# Patient Record
Sex: FEMALE | Race: WHITE | NOT HISPANIC OR LATINO | Employment: OTHER | RURAL
[De-identification: names, ages, dates, MRNs, and addresses within clinical notes are randomized per-mention and may not be internally consistent; named-entity substitution may affect disease eponyms.]

---

## 2017-01-20 DIAGNOSIS — I10 ESSENTIAL HYPERTENSION: ICD-10-CM

## 2017-01-20 DIAGNOSIS — E11.8 TYPE 2 DIABETES MELLITUS WITH COMPLICATION, UNSPECIFIED LONG TERM INSULIN USE STATUS: ICD-10-CM

## 2017-01-20 DIAGNOSIS — I65.23 BILATERAL CAROTID ARTERY STENOSIS: ICD-10-CM

## 2017-01-20 DIAGNOSIS — E78.2 MIXED HYPERLIPIDEMIA: Primary | ICD-10-CM

## 2017-01-20 DIAGNOSIS — I77.9 BILATERAL CAROTID ARTERY DISEASE (HCC): ICD-10-CM

## 2017-02-28 ENCOUNTER — OFFICE VISIT (OUTPATIENT)
Dept: CARDIOLOGY | Facility: CLINIC | Age: 77
End: 2017-02-28

## 2017-02-28 VITALS
HEART RATE: 61 BPM | DIASTOLIC BLOOD PRESSURE: 64 MMHG | BODY MASS INDEX: 18.56 KG/M2 | WEIGHT: 111.4 LBS | SYSTOLIC BLOOD PRESSURE: 129 MMHG | HEIGHT: 65 IN

## 2017-02-28 DIAGNOSIS — E78.5 DYSLIPIDEMIA: ICD-10-CM

## 2017-02-28 DIAGNOSIS — I25.10 CORONARY ARTERY DISEASE INVOLVING NATIVE CORONARY ARTERY OF NATIVE HEART WITHOUT ANGINA PECTORIS: Primary | ICD-10-CM

## 2017-02-28 DIAGNOSIS — I77.9 BILATERAL CAROTID ARTERY DISEASE (HCC): ICD-10-CM

## 2017-02-28 DIAGNOSIS — I10 ESSENTIAL HYPERTENSION: ICD-10-CM

## 2017-02-28 PROCEDURE — 99213 OFFICE O/P EST LOW 20 MIN: CPT | Performed by: NURSE PRACTITIONER

## 2017-02-28 RX ORDER — METOPROLOL TARTRATE 100 MG/1
100 TABLET ORAL EVERY 12 HOURS
Qty: 180 TABLET | Refills: 3 | Status: SHIPPED | OUTPATIENT
Start: 2017-02-28 | End: 2020-07-07 | Stop reason: SDUPTHER

## 2017-02-28 RX ORDER — ENALAPRIL MALEATE 10 MG/1
10 TABLET ORAL DAILY
Qty: 90 TABLET | Refills: 3 | Status: SHIPPED | OUTPATIENT
Start: 2017-02-28 | End: 2019-11-12 | Stop reason: SDUPTHER

## 2017-02-28 RX ORDER — SIMVASTATIN 40 MG
40 TABLET ORAL DAILY
Qty: 90 TABLET | Refills: 3 | Status: SHIPPED | OUTPATIENT
Start: 2017-02-28 | End: 2018-03-06

## 2017-02-28 RX ORDER — AMLODIPINE BESYLATE 5 MG/1
5 TABLET ORAL DAILY
Qty: 90 TABLET | Refills: 3 | Status: SHIPPED | OUTPATIENT
Start: 2017-02-28 | End: 2020-05-26 | Stop reason: SDUPTHER

## 2017-02-28 NOTE — PROGRESS NOTES
Subjective:     Encounter Date:02/28/2017      Patient ID: Maria Del Carmen De Los Santos is a 76 y.o. female.    Chief Complaint: Follow-up for CAD      PROBLEM LIST:   1. Coronary artery disease:  a. Symptoms of unstable angina.  b. Cardiac catheterization, 06/02/2010, by Dr. Zuluaga showed severe triple-vessel disease with normal LV function.   c. Four-vessel coronary artery bypass graft surgery, 06/07/2010, with LIMA graft to the LAD, veins to the ramus intermedius, circumflex and PDA.   2. Carotid artery disease:  a. Carotid angiogram, 06/02/2010, showed 90% stenosis of the right internal carotid artery.  b. Stenting of the right internal carotid artery by Dr. Jauregui using 8 mm x 40 mm stent.  c. Carotid duplex, 12/09/2011, showed patent right ICA stent, suspected 80% stenosis of the left ICA; asymptomatic.   d. Carotid ultrasound, 06/29/2012, showed 60% to 79% left ICA stenosis, patent right ICA stent.  e. Carotid duplex study, 07/22/2013, acceptable velocities; no significant change.   f. Carotid duplex scan at [facility], February 2015, was an unclear study, essentially comments on the left ICA only with moderate elevation of velocity to 223. No symptoms.  g. Persistent bilateral carotid bruit.   3. Hypertension.  4. Dyslipidemia.  5. Diabetes type 2.  6. Claudication.  7. Acute versus chronic kidney disease.         No Known Allergies      Current Outpatient Prescriptions:   •  amLODIPine (NORVASC) 5 MG tablet, Take  by mouth daily., Disp: , Rfl:   •  aspirin 325 MG tablet, Take 325 mg by mouth daily., Disp: , Rfl:   •  enalapril (VASOTEC) 10 MG tablet, Take 10 mg by mouth daily., Disp: , Rfl:   •  metFORMIN (GLUCOPHAGE) 500 MG tablet, Take 500 mg by mouth 2 (two) times a day with meals., Disp: , Rfl:   •  metoprolol tartrate (LOPRESSOR) 100 MG tablet, Take  by mouth every 12 (twelve) hours., Disp: , Rfl:   •  simvastatin (ZOCOR) 40 MG tablet, Take  by mouth daily., Disp: , Rfl:         History of Present  "Illness    Patient returns today for annual follow-up of coronary artery disease.  Since last being seen she overall notes she is done well.  Denies any chest pain, pressure, tightness.  Denies any increasing shortness of breath.  Denies any syncope, near syncope.  She does note some occasional dizziness.  This is infrequent and occurs primarily with position changes such as standing or bending over.  Her labs are followed by her primary care physician.  Only complaint is of some generalized fatigue.  However, notes that she is not typically very active and is limited secondary to arthritis discomfort.  She has an appointment in April with Dr. Tano Jauregui regarding her crest study.    The following portions of the patient's history were reviewed and updated as appropriate: allergies, current medications, past family history, past medical history, past social history, past surgical history and problem list.        Social History   Substance Use Topics   • Smoking status: Never Smoker   • Smokeless tobacco: None   • Alcohol use No         Review of Systems   Constitution: Positive for malaise/fatigue. Negative for fever and weakness.   HENT: Negative for headaches and nosebleeds.    Eyes: Negative for redness and visual disturbance.   Cardiovascular: Negative for orthopnea, palpitations and paroxysmal nocturnal dyspnea.   Respiratory: Negative for cough, snoring, sputum production and wheezing.    Hematologic/Lymphatic: Negative for bleeding problem.   Skin: Negative for flushing, itching and rash.   Musculoskeletal: Negative for falls, joint pain and muscle cramps.   Gastrointestinal: Negative for abdominal pain, diarrhea, heartburn, nausea and vomiting.   Genitourinary: Negative for hematuria.   Neurological: Positive for dizziness. Negative for excessive daytime sleepiness and tremors.   Psychiatric/Behavioral: Negative for substance abuse. The patient is not nervous/anxious.           Objective:    height is 65\" " (165.1 cm) and weight is 111 lb 6.4 oz (50.5 kg). Her blood pressure is 129/64 and her pulse is 61.         Physical Exam   Constitutional: She is oriented to person, place, and time. She appears well-developed and well-nourished. No distress.   HENT:   Mouth/Throat: Oropharynx is clear and moist.   Neck: No JVD present. Carotid bruit is present (left bruit appreciated).   Cardiovascular: Normal rate, regular rhythm, S1 normal, S2 normal and normal heart sounds.    Pulmonary/Chest: Effort normal and breath sounds normal. No respiratory distress.   Abdominal: Soft. Bowel sounds are normal. There is no tenderness.   Musculoskeletal: She exhibits no edema.   Neurological: She is alert and oriented to person, place, and time.   Skin: Skin is warm and dry.       Procedures          Assessment:   Assessment/Plan      Diagnoses and all orders for this visit:    Coronary artery disease involving native coronary artery of native heart without angina pectoris    Essential hypertension    Dyslipidemia    Bilateral carotid artery disease      Plan:    At this time we'll make no changes to the patient's medication regimen.  We'll have her keep her follow-up with Dr. Jauregui regarding her carotid disease.  We'll see her back in one year's time or sooner if needed.    TIARA Marie         Please note that portions of this note may have been completed with a voice recognition program. Efforts were made to edit the dictations, but occasionally words are mistranscribed.

## 2017-04-25 ENCOUNTER — APPOINTMENT (OUTPATIENT)
Dept: CARDIOLOGY | Facility: HOSPITAL | Age: 77
End: 2017-04-25
Attending: INTERNAL MEDICINE

## 2017-05-03 ENCOUNTER — OFFICE VISIT (OUTPATIENT)
Dept: CARDIOLOGY | Facility: CLINIC | Age: 77
End: 2017-05-03

## 2017-05-03 ENCOUNTER — HOSPITAL ENCOUNTER (OUTPATIENT)
Dept: CARDIOLOGY | Facility: HOSPITAL | Age: 77
Discharge: HOME | End: 2017-05-03
Attending: INTERNAL MEDICINE | Admitting: INTERNAL MEDICINE

## 2017-05-03 VITALS — SYSTOLIC BLOOD PRESSURE: 150 MMHG | DIASTOLIC BLOOD PRESSURE: 70 MMHG

## 2017-05-03 DIAGNOSIS — I65.23 BILATERAL CAROTID ARTERY STENOSIS: ICD-10-CM

## 2017-05-03 DIAGNOSIS — I10 ESSENTIAL HYPERTENSION: ICD-10-CM

## 2017-05-03 DIAGNOSIS — E78.2 MIXED HYPERLIPIDEMIA: ICD-10-CM

## 2017-05-03 DIAGNOSIS — I77.9 BILATERAL CAROTID ARTERY DISEASE (HCC): Primary | ICD-10-CM

## 2017-05-03 DIAGNOSIS — I77.9 BILATERAL CAROTID ARTERY DISEASE (HCC): ICD-10-CM

## 2017-05-03 PROCEDURE — 93880 EXTRACRANIAL BILAT STUDY: CPT

## 2017-05-03 PROCEDURE — 93880 EXTRACRANIAL BILAT STUDY: CPT | Performed by: INTERNAL MEDICINE

## 2017-05-08 LAB
BH CV XLRA MEAS LEFT CCA RATIO VEL: 104 CM/SEC
BH CV XLRA MEAS LEFT DIST CCA EDV: 31.4 CM/SEC
BH CV XLRA MEAS LEFT DIST CCA PSV: 103 CM/SEC
BH CV XLRA MEAS LEFT DIST ICA EDV: 35.2 CM/SEC
BH CV XLRA MEAS LEFT DIST ICA PSV: 141 CM/SEC
BH CV XLRA MEAS LEFT ICA RATIO VEL: 406 CM/SEC
BH CV XLRA MEAS LEFT ICA/CCA RATIO: 3.9
BH CV XLRA MEAS LEFT MID CCA EDV: 27 CM/SEC
BH CV XLRA MEAS LEFT MID CCA PSV: 104 CM/SEC
BH CV XLRA MEAS LEFT MID ICA EDV: 69.8 CM/SEC
BH CV XLRA MEAS LEFT MID ICA PSV: 266 CM/SEC
BH CV XLRA MEAS LEFT PROX CCA EDV: 22.1 CM/SEC
BH CV XLRA MEAS LEFT PROX CCA PSV: 85.5 CM/SEC
BH CV XLRA MEAS LEFT PROX ECA PSV: 189 CM/SEC
BH CV XLRA MEAS LEFT PROX ICA EDV: 103 CM/SEC
BH CV XLRA MEAS LEFT PROX ICA PSV: 406 CM/SEC
BH CV XLRA MEAS LEFT PROX SCLA PSV: 152 CM/SEC
BH CV XLRA MEAS LEFT VERTEBRAL A EDV: 19.2 CM/SEC
BH CV XLRA MEAS LEFT VERTEBRAL A PSV: 60.2 CM/SEC
BH CV XLRA MEAS RIGHT CCA RATIO VEL: 112 CM/SEC
BH CV XLRA MEAS RIGHT DIST CCA EDV: 19.6 CM/SEC
BH CV XLRA MEAS RIGHT DIST CCA PSV: 112 CM/SEC
BH CV XLRA MEAS RIGHT DIST ICA EDV: 17.6 CM/SEC
BH CV XLRA MEAS RIGHT DIST ICA PSV: 67.9 CM/SEC
BH CV XLRA MEAS RIGHT ICA RATIO VEL: 84.2 CM/SEC
BH CV XLRA MEAS RIGHT ICA/CCA RATIO: 0.75
BH CV XLRA MEAS RIGHT MID CCA EDV: 15.4 CM/SEC
BH CV XLRA MEAS RIGHT MID CCA PSV: 112 CM/SEC
BH CV XLRA MEAS RIGHT MID ICA EDV: 17 CM/SEC
BH CV XLRA MEAS RIGHT MID ICA PSV: 62.2 CM/SEC
BH CV XLRA MEAS RIGHT PROX CCA EDV: 17.5 CM/SEC
BH CV XLRA MEAS RIGHT PROX CCA PSV: 108 CM/SEC
BH CV XLRA MEAS RIGHT PROX ECA PSV: 149 CM/SEC
BH CV XLRA MEAS RIGHT PROX ICA EDV: 13.8 CM/SEC
BH CV XLRA MEAS RIGHT PROX ICA PSV: 84.2 CM/SEC
BH CV XLRA MEAS RIGHT PROX SCLA PSV: 137 CM/SEC
BH CV XLRA MEAS RIGHT VERTEBRAL A EDV: 11.4 CM/SEC
BH CV XLRA MEAS RIGHT VERTEBRAL A PSV: 40.2 CM/SEC
LEFT ARM BP: NORMAL MMHG
RIGHT ARM BP: NORMAL MMHG

## 2017-11-09 DIAGNOSIS — I10 ESSENTIAL HYPERTENSION: ICD-10-CM

## 2017-11-09 DIAGNOSIS — I65.23 BILATERAL CAROTID ARTERY STENOSIS: Primary | ICD-10-CM

## 2017-11-09 DIAGNOSIS — E78.5 DYSLIPIDEMIA: ICD-10-CM

## 2018-03-06 ENCOUNTER — OFFICE VISIT (OUTPATIENT)
Dept: CARDIOLOGY | Facility: CLINIC | Age: 78
End: 2018-03-06

## 2018-03-06 VITALS
WEIGHT: 115 LBS | BODY MASS INDEX: 19.16 KG/M2 | DIASTOLIC BLOOD PRESSURE: 68 MMHG | HEIGHT: 65 IN | SYSTOLIC BLOOD PRESSURE: 146 MMHG | HEART RATE: 62 BPM

## 2018-03-06 DIAGNOSIS — I77.9 BILATERAL CAROTID ARTERY DISEASE (HCC): ICD-10-CM

## 2018-03-06 DIAGNOSIS — I10 ESSENTIAL HYPERTENSION: ICD-10-CM

## 2018-03-06 DIAGNOSIS — I25.10 CORONARY ARTERY DISEASE INVOLVING NATIVE CORONARY ARTERY OF NATIVE HEART WITHOUT ANGINA PECTORIS: Primary | ICD-10-CM

## 2018-03-06 DIAGNOSIS — E78.5 DYSLIPIDEMIA: ICD-10-CM

## 2018-03-06 PROCEDURE — 99214 OFFICE O/P EST MOD 30 MIN: CPT | Performed by: INTERNAL MEDICINE

## 2018-03-06 RX ORDER — ROSUVASTATIN CALCIUM 20 MG/1
20 TABLET, COATED ORAL DAILY
COMMUNITY
End: 2020-07-07 | Stop reason: SDUPTHER

## 2018-03-06 NOTE — PROGRESS NOTES
Encounter Date:03/06/2018      Patient ID: Maria Del Carmen De Los Santos is a 77 y.o. female.    Chief Complaint: Coronary Artery Disease      PROBLEM LIST:  1. Coronary artery disease:  a. Symptoms of unstable angina.  b. Cardiac catheterization, 06/02/2010, by Dr. Zuluaga showed severe triple-vessel disease with normal LV function.   c. Four-vessel coronary artery bypass graft surgery, 06/07/2010, with LIMA graft to the LAD, veins to the ramus intermedius, circumflex and PDA.   2. Carotid artery disease:  a. Carotid angiogram, 06/02/2010, showed 90% stenosis of the right internal carotid artery.  b. Stenting of the right internal carotid artery by Dr. Jauregui using 8 mm x 40 mm stent.  c. Carotid duplex, 12/09/2011, showed patent right ICA stent, suspected 80% stenosis of the left ICA; asymptomatic.   d. Carotid ultrasound, 06/29/2012, showed 60% to 79% left ICA stenosis, patent right ICA stent.  e. Carotid duplex study, 07/22/2013, acceptable velocities; no significant change.   f. Carotid duplex scan at [facility], February 2015, was an unclear study, essentially comments on the left ICA only with moderate elevation of velocity to 223. No symptoms.  g. Persistent bilateral carotid bruit.   3. Hypertension.  4. Dyslipidemia.  5. Diabetes type 2.  6. Claudication.  7. Acute versus chronic kidney disease.     History of Present Illness  Patient presents today for follow-up with a history of CAD, carotid artery disease, and cardiac risk factors. Since last visit has been doing well form a cardiac standpoint. Does not monitor her blood pressure at home. Denies chest pain, shortness of breath, leg swelling, palpitations, and syncope. Remains busy and active with household chores and walking with no limitaitons.    No Known Allergies      Current Outpatient Prescriptions:   •  amLODIPine (NORVASC) 5 MG tablet, Take 1 tablet by mouth Daily., Disp: 90 tablet, Rfl: 3  •  aspirin 325 MG tablet, Take 325 mg by mouth  "daily., Disp: , Rfl:   •  enalapril (VASOTEC) 10 MG tablet, Take 1 tablet by mouth Daily., Disp: 90 tablet, Rfl: 3  •  metoprolol tartrate (LOPRESSOR) 100 MG tablet, Take 1 tablet by mouth Every 12 (Twelve) Hours., Disp: 180 tablet, Rfl: 3  •  rosuvastatin (CRESTOR) 20 MG tablet, Take 20 mg by mouth Daily., Disp: , Rfl:   •  metFORMIN (GLUCOPHAGE) 500 MG tablet, Take 750 mg by mouth Daily With Breakfast., Disp: , Rfl:     The following portions of the patient's history were reviewed and updated as appropriate: allergies, current medications, past family history, past medical history, past social history, past surgical history and problem list.    ROS  Review of Systems   Constitution: Negative for chills, fever, weight gain and weight loss.   Cardiovascular: Negative for chest pain, claudication, dyspnea on exertion, leg swelling, orthopnea, palpitations, paroxysmal nocturnal dyspnea and syncope.        No dizziness   Gastrointestinal: Negative for abdominal pain, constipation, diarrhea, nausea and vomiting.   Genitourinary:        No urinary symptoms   Neurological:        No symptoms of stroke.   All other systems reviewed and are negative.    Objective:     Blood pressure 146/68, pulse 62, height 165.1 cm (65\"), weight 52.2 kg (115 lb).  Repeat blood pressure measurement by, Amarjit Zuluaga MD, at 136/64      Physical Exam  Constitutional: She appears well-developed and well-nourished.   HENT:   HEENT exam unremarkable.   Neck: Neck supple. No JVD present.   Bilateral carotid bruits.   Cardiovascular: Normal rate, regular rhythm and normal heart sounds.    No murmur heard.  2 plus symmetric pulses.   Pulmonary/Chest: Breath sounds normal. Does not exhibit tenderness.   Abdominal:   Abdomen benign.   Musculoskeletal: Does not exhibit edema.   Neurological:   Neurological exam unremarkable.   Vitals reviewed.    Lab Review:   Procedures       Assessment:      Diagnosis Plan   1. Coronary artery disease involving " native coronary artery of native heart without angina pectoris  Stable   2. Bilateral carotid artery disease  Duplex Carotid Ultrasound   3. Essential hypertension  Repeat blood pressure reading during visit was 136/64   4. Dyslipidemia. Goal LDL < 70 Continue Crestor 20 mg     Plan:   Stable cardiac status, we will check carotid ultrasound for follow-up.  Continue current medications.   FU in 12 MO, sooner as needed.  Thank you for allowing us to participate in the care of your patient.     Scribed for Amarjit Zuluaga MD by Hitesh Collins. 3/6/2018  10:27 AM    I, Amarjit Zuluaga MD, personally performed the services described in this documentation as scribed by the above named individual in my presence, and it is both accurate and complete.  3/6/2018  11:55 AM        Please note that portions of this note may have been completed with a voice recognition program. Efforts were made to edit the dictations, but occasionally words are mistranscribed.

## 2018-03-09 DIAGNOSIS — I65.23 BILATERAL CAROTID ARTERY STENOSIS: Primary | ICD-10-CM

## 2018-04-17 ENCOUNTER — HOSPITAL ENCOUNTER (OUTPATIENT)
Dept: CARDIOLOGY | Facility: HOSPITAL | Age: 78
Discharge: HOME | End: 2018-04-17
Attending: INTERNAL MEDICINE | Admitting: INTERNAL MEDICINE

## 2018-04-17 ENCOUNTER — LAB (OUTPATIENT)
Dept: LAB | Facility: HOSPITAL | Age: 78
End: 2018-04-17

## 2018-04-17 ENCOUNTER — OFFICE VISIT (OUTPATIENT)
Dept: CARDIOLOGY | Facility: CLINIC | Age: 78
End: 2018-04-17

## 2018-04-17 DIAGNOSIS — I10 ESSENTIAL HYPERTENSION: ICD-10-CM

## 2018-04-17 DIAGNOSIS — I65.22 LEFT CAROTID STENOSIS: Primary | ICD-10-CM

## 2018-04-17 DIAGNOSIS — I65.23 BILATERAL CAROTID ARTERY STENOSIS: ICD-10-CM

## 2018-04-17 DIAGNOSIS — E78.5 DYSLIPIDEMIA: ICD-10-CM

## 2018-04-17 DIAGNOSIS — R73.03 PREDIABETES: ICD-10-CM

## 2018-04-17 LAB
ARTICHOKE IGE QN: 44 MG/DL (ref 0–130)
CHOLEST SERPL-MCNC: 117 MG/DL (ref 0–200)
CREAT BLD-MCNC: 1.3 MG/DL (ref 0.6–1.3)
GFR SERPL CREATININE-BSD FRML MDRD: 40 ML/MIN/1.73
HBA1C MFR BLD: 6.6 % (ref 4.8–5.6)
HDLC SERPL-MCNC: 54 MG/DL (ref 40–60)
POTASSIUM BLD-SCNC: 5.3 MMOL/L (ref 3.5–5.5)
TRIGL SERPL-MCNC: 111 MG/DL (ref 0–150)

## 2018-04-17 PROCEDURE — 82565 ASSAY OF CREATININE: CPT

## 2018-04-17 PROCEDURE — 84132 ASSAY OF SERUM POTASSIUM: CPT

## 2018-04-17 PROCEDURE — 93880 EXTRACRANIAL BILAT STUDY: CPT

## 2018-04-17 PROCEDURE — 83036 HEMOGLOBIN GLYCOSYLATED A1C: CPT

## 2018-04-17 PROCEDURE — 80061 LIPID PANEL: CPT

## 2018-04-17 PROCEDURE — 93880 EXTRACRANIAL BILAT STUDY: CPT | Performed by: INTERNAL MEDICINE

## 2018-04-17 PROCEDURE — 36415 COLL VENOUS BLD VENIPUNCTURE: CPT

## 2018-04-17 NOTE — PROGRESS NOTES
04/17/2018    Maria Del Carmen De Los Santos  1940      CREST-2 -  2 year FOLLOWUP    Left ICA STENOSIS    ARM OF STUDY:  VALARIE arm - medical management only    Medications:   •  amlodipine 5 MG by mouth Daily.  •  aspirin 325 mg by mouth daily.   •  enalapril 10 MG by mouth Daily.  •  metformin 750 mg by mouth Daily With Breakfast.  •  metoprolol tartrate 100 MG by mouth Every 12 (Twelve) Hours  •  rosuvastatin 20 mg by mouth Daily.    AVERAGE BP:  130/63, HR 62  Weight: 114.6 lbs    Recent cataract surgery  No hospitalizations.  No symptoms of TIA/stroke.    RECENT LABS:  Pending    CAROTID DUPLEX Pending      ORDERS AND MEDICATION CHANGES:      *THERE IS NO CHARGE FOR THIS VISIT*    John Claros MD  PRIMARY CARDIOLOGIST:  Amarjit Zuluaga MD

## 2018-04-23 LAB
BH CV XLRA MEAS LEFT CCA RATIO VEL: 102 CM/SEC
BH CV XLRA MEAS LEFT DIST CCA EDV: 21.6 CM/SEC
BH CV XLRA MEAS LEFT DIST CCA PSV: 101.2 CM/SEC
BH CV XLRA MEAS LEFT DIST ICA EDV: 30.3 CM/SEC
BH CV XLRA MEAS LEFT DIST ICA PSV: 128 CM/SEC
BH CV XLRA MEAS LEFT ICA RATIO VEL: 327 CM/SEC
BH CV XLRA MEAS LEFT ICA/CCA RATIO: 3.2
BH CV XLRA MEAS LEFT MID CCA EDV: 23.6 CM/SEC
BH CV XLRA MEAS LEFT MID CCA PSV: 103.1 CM/SEC
BH CV XLRA MEAS LEFT MID ICA EDV: 85.6 CM/SEC
BH CV XLRA MEAS LEFT MID ICA PSV: 328.3 CM/SEC
BH CV XLRA MEAS LEFT PROX CCA EDV: 25.5 CM/SEC
BH CV XLRA MEAS LEFT PROX CCA PSV: 106.1 CM/SEC
BH CV XLRA MEAS LEFT PROX ECA PSV: 176.4 CM/SEC
BH CV XLRA MEAS LEFT PROX ICA EDV: 57.8 CM/SEC
BH CV XLRA MEAS LEFT PROX ICA PSV: 183.5 CM/SEC
BH CV XLRA MEAS LEFT PROX SCLA PSV: 144.4 CM/SEC
BH CV XLRA MEAS LEFT VERTEBRAL A PSV: 65.4 CM/SEC
BH CV XLRA MEAS RIGHT CCA RATIO VEL: 95.4 CM/SEC
BH CV XLRA MEAS RIGHT DIST CCA EDV: 14.1 CM/SEC
BH CV XLRA MEAS RIGHT DIST CCA PSV: 88 CM/SEC
BH CV XLRA MEAS RIGHT DIST ICA EDV: 17.7 CM/SEC
BH CV XLRA MEAS RIGHT DIST ICA PSV: 74.6 CM/SEC
BH CV XLRA MEAS RIGHT ICA RATIO VEL: 84.7 CM/SEC
BH CV XLRA MEAS RIGHT ICA/CCA RATIO: 0.89
BH CV XLRA MEAS RIGHT MID CCA EDV: 16.8 CM/SEC
BH CV XLRA MEAS RIGHT MID CCA PSV: 96.5 CM/SEC
BH CV XLRA MEAS RIGHT MID ICA EDV: 10.3 CM/SEC
BH CV XLRA MEAS RIGHT MID ICA PSV: 53.5 CM/SEC
BH CV XLRA MEAS RIGHT PROX CCA EDV: 23.6 CM/SEC
BH CV XLRA MEAS RIGHT PROX CCA PSV: 105.5 CM/SEC
BH CV XLRA MEAS RIGHT PROX ECA PSV: 215 CM/SEC
BH CV XLRA MEAS RIGHT PROX ICA EDV: 17.5 CM/SEC
BH CV XLRA MEAS RIGHT PROX ICA PSV: 85.6 CM/SEC
BH CV XLRA MEAS RIGHT PROX SCLA PSV: 178.5 CM/SEC
BH CV XLRA MEAS RIGHT VERTEBRAL A PSV: 53 CM/SEC
LEFT ARM BP: NORMAL MMHG
RIGHT ARM BP: NORMAL MMHG

## 2018-10-22 DIAGNOSIS — I10 ESSENTIAL HYPERTENSION: ICD-10-CM

## 2018-10-22 DIAGNOSIS — E78.5 DYSLIPIDEMIA, GOAL LDL BELOW 70: ICD-10-CM

## 2018-10-22 DIAGNOSIS — I65.23 BILATERAL CAROTID ARTERY STENOSIS: Primary | ICD-10-CM

## 2018-10-22 DIAGNOSIS — E11.9 CONTROLLED TYPE 2 DIABETES MELLITUS WITHOUT COMPLICATION, WITHOUT LONG-TERM CURRENT USE OF INSULIN (HCC): ICD-10-CM

## 2018-10-22 NOTE — PROGRESS NOTES
creatinine, potassium, sodium ,lipid panel and HGA1C with doppler then see [facility] same day.   (04/16/2019-06/14/2019

## 2019-03-19 ENCOUNTER — OFFICE VISIT (OUTPATIENT)
Dept: CARDIOLOGY | Facility: CLINIC | Age: 79
End: 2019-03-19

## 2019-03-19 VITALS
WEIGHT: 124 LBS | BODY MASS INDEX: 20.66 KG/M2 | HEART RATE: 66 BPM | HEIGHT: 65 IN | SYSTOLIC BLOOD PRESSURE: 132 MMHG | DIASTOLIC BLOOD PRESSURE: 64 MMHG

## 2019-03-19 DIAGNOSIS — E78.5 DYSLIPIDEMIA: ICD-10-CM

## 2019-03-19 DIAGNOSIS — I65.22 LEFT CAROTID STENOSIS: ICD-10-CM

## 2019-03-19 DIAGNOSIS — I10 ESSENTIAL HYPERTENSION: ICD-10-CM

## 2019-03-19 DIAGNOSIS — I25.10 CORONARY ARTERY DISEASE INVOLVING NATIVE CORONARY ARTERY OF NATIVE HEART WITHOUT ANGINA PECTORIS: Primary | ICD-10-CM

## 2019-03-19 PROCEDURE — 99214 OFFICE O/P EST MOD 30 MIN: CPT | Performed by: INTERNAL MEDICINE

## 2019-03-19 NOTE — PROGRESS NOTES
Encounter Date:03/19/2019      Patient ID: Maria Del Carmen De Los Santos is a 78 y.o. female.    Chief Complaint: Coronary Artery Disease      PROBLEM LIST:  1. Coronary artery disease:  a. Symptoms of unstable angina.  b. LHC, 06/02/2010, by Dr. Zuluaga showed severe 3-vessel disease with normal LV function.   c. CABG x4,06/07/2010: CLEMENTS-LAD, SVG to the ramus intermedius, circumflex and PDA.   2. Carotid artery disease:  a. Carotid anogram, 06/02/2010, showed 90% stenosis of the right ICA.   b. Stenting of the right ICA by Dr. Jauregui using 8 mm x 40 mm stent.  c. Carotid duplex, 12/09/2011: Patent right ICA stent, suspected 80% stenosis of the left ICA; asymptomatic.   d. Carotid duplex, 06/29/2012: 60-79% left ICA stenosis, patent right ICA stent.  e. Carotid duplex, 07/22/2013: Acceptable velocities; no significant change.   f. Carotid duplex, February 2015, [facility]: Unclear study, essentially comments on the left ICA only with moderate elevation of velocity to 223. No symptoms.  g. Persistent bilateral carotid bruit.   3. Hypertension.  4. Dyslipidemia.  5. Diabetes type 2.  6. Claudication.  7. Acute versus chronic kidney disease.     History of Present Illness  Patient presents today for annual follow-up with a history of coronary artery disease, carotid artery disease, and cardiac risk factors. Since last visit, she has been doing well overall from a cardiovascular standpoint. Denies chest pain, shortness of breath, leg swelling, palpitations, and syncope. Remains busy and active with her daily activities. Patient lives alone, and does all of her housework herself. She remains a non-smoker.    No Known Allergies      Current Outpatient Medications:   •  amLODIPine (NORVASC) 5 MG tablet, Take 1 tablet by mouth Daily., Disp: 90 tablet, Rfl: 3  •  aspirin 325 MG tablet, Take 325 mg by mouth daily., Disp: , Rfl:   •  enalapril (VASOTEC) 10 MG tablet, Take 1 tablet by mouth Daily., Disp: 90 tablet, Rfl:  "3  •  metFORMIN (GLUCOPHAGE) 500 MG tablet, Take 750 mg by mouth Daily With Breakfast., Disp: , Rfl:   •  metoprolol tartrate (LOPRESSOR) 100 MG tablet, Take 1 tablet by mouth Every 12 (Twelve) Hours., Disp: 180 tablet, Rfl: 3  •  rosuvastatin (CRESTOR) 20 MG tablet, Take 20 mg by mouth Daily., Disp: , Rfl:     The following portions of the patient's history were reviewed and updated as appropriate: allergies, current medications, past family history, past medical history, past social history, past surgical history and problem list.    ROS  Review of Systems   Constitution: Negative for chills, fatigue, fever, generalized weakness, weight gain and weight loss.   Cardiovascular: Negative for chest pain, claudication, dyspnea on exertion, leg swelling, orthopnea, palpitations, paroxysmal nocturnal dyspnea and syncope.   Respiratory: Negative for cough, shortness of breath, snoring, and wheezing.  HENT: Negative for ear pain, nosebleeds, and tinnitus.  Gastrointestinal: Negative for abdominal pain, constipation, diarrhea, nausea and vomiting.   Genitourinary: No urinary symptoms   Neurological: Negative for dizziness, headaches, loss of balance, numbness, and symptoms of stroke.  Psychiatric: Normal mental status.     All other systems reviewed and are negative.    Objective:     /64 (BP Location: Left arm, Patient Position: Sitting)   Pulse 66   Ht 165.1 cm (65\")   Wt 56.2 kg (124 lb)   BMI 20.63 kg/m²        Physical Exam  Constitutional: Patient appears well-developed and well-nourished.   HENT: HEENT exam unremarkable.   Neck: Neck supple. No JVD present. No carotid bruits.   Cardiovascular: Normal rate, regular rhythm and normal heart sounds. No murmur heard.   2+ symmetric pulses.   Pulmonary/Chest: Breath sounds normal. Does not exhibit tenderness.   Abdominal: Abdomen benign.   Musculoskeletal: Does not exhibit edema.   Neurological: Neurological exam unremarkable.   Vitals reviewed.    Lab Review: "   Lab Results   Component Value Date    CHOL 117 04/17/2018    CHLPL 129 04/04/2016    TRIG 111 04/17/2018    HDL 54 04/17/2018    LDL 44 04/17/2018     Procedures       Assessment:      Diagnosis Plan   1. Coronary artery disease involving native coronary artery of native heart without angina pectoris  Stable, continue current medications.   2. Essential hypertension  Well-controlled, continue current medications.   3. Left carotid stenosis  Stable and asymptomatic, continue current medications.   4. Dyslipidemia  Well-controlled, continue rosuvastatin 20 mg.     Plan:   Stable cardiac status.  Continue current medications.   FU in 12 MO, sooner as needed.  Thank you for allowing us to participate in the care of your patient.     Scribed for Amarjit Zuluaga MD by Mercy Price. 3/19/2019  10:13 AM      I, Amarjit Zuluaga MD, personally performed the services described in this documentation as scribed by the above named individual in my presence, and it is both accurate and complete.  3/19/2019  10:21 AM        Please note that portions of this note may have been completed with a voice recognition program. Efforts were made to edit the dictations, but occasionally words are mistranscribed.

## 2019-04-25 ENCOUNTER — OFFICE VISIT (OUTPATIENT)
Dept: CARDIOLOGY | Facility: CLINIC | Age: 79
End: 2019-04-25

## 2019-04-25 ENCOUNTER — HOSPITAL ENCOUNTER (OUTPATIENT)
Dept: CARDIOLOGY | Facility: HOSPITAL | Age: 79
Discharge: HOME | End: 2019-04-25
Attending: INTERNAL MEDICINE | Admitting: INTERNAL MEDICINE

## 2019-04-25 ENCOUNTER — LAB (OUTPATIENT)
Dept: LAB | Facility: HOSPITAL | Age: 79
End: 2019-04-25

## 2019-04-25 VITALS
HEIGHT: 65 IN | BODY MASS INDEX: 18.33 KG/M2 | HEART RATE: 69 BPM | SYSTOLIC BLOOD PRESSURE: 125 MMHG | DIASTOLIC BLOOD PRESSURE: 62 MMHG | WEIGHT: 110 LBS

## 2019-04-25 VITALS — HEIGHT: 65 IN | WEIGHT: 124 LBS | BODY MASS INDEX: 20.66 KG/M2

## 2019-04-25 DIAGNOSIS — I10 ESSENTIAL HYPERTENSION: ICD-10-CM

## 2019-04-25 DIAGNOSIS — E11.9 CONTROLLED TYPE 2 DIABETES MELLITUS WITHOUT COMPLICATION, WITHOUT LONG-TERM CURRENT USE OF INSULIN (HCC): ICD-10-CM

## 2019-04-25 DIAGNOSIS — I65.23 BILATERAL CAROTID ARTERY STENOSIS: ICD-10-CM

## 2019-04-25 DIAGNOSIS — E78.5 DYSLIPIDEMIA: ICD-10-CM

## 2019-04-25 DIAGNOSIS — I65.22 LEFT CAROTID STENOSIS: Primary | ICD-10-CM

## 2019-04-25 DIAGNOSIS — E78.5 DYSLIPIDEMIA, GOAL LDL BELOW 70: ICD-10-CM

## 2019-04-25 LAB
CHOLEST SERPL-MCNC: 124 MG/DL (ref 0–200)
CREAT BLD-MCNC: 1.18 MG/DL (ref 0.57–1)
GFR SERPL CREATININE-BSD FRML MDRD: 44 ML/MIN/1.73
HBA1C MFR BLD: 5.93 % (ref 4.8–5.6)
HDLC SERPL-MCNC: 54 MG/DL (ref 40–60)
LDLC SERPL CALC-MCNC: 49 MG/DL (ref 0–100)
LDLC/HDLC SERPL: 0.91 {RATIO}
POTASSIUM BLD-SCNC: 4.6 MMOL/L (ref 3.5–5.2)
SODIUM BLD-SCNC: 143 MMOL/L (ref 136–145)
TRIGL SERPL-MCNC: 103 MG/DL (ref 0–150)
VLDLC SERPL-MCNC: 20.6 MG/DL (ref 5–40)

## 2019-04-25 PROCEDURE — 82565 ASSAY OF CREATININE: CPT

## 2019-04-25 PROCEDURE — 84132 ASSAY OF SERUM POTASSIUM: CPT

## 2019-04-25 PROCEDURE — 93880 EXTRACRANIAL BILAT STUDY: CPT | Performed by: INTERNAL MEDICINE

## 2019-04-25 PROCEDURE — 83036 HEMOGLOBIN GLYCOSYLATED A1C: CPT

## 2019-04-25 PROCEDURE — 93880 EXTRACRANIAL BILAT STUDY: CPT

## 2019-04-25 PROCEDURE — 36415 COLL VENOUS BLD VENIPUNCTURE: CPT

## 2019-04-25 PROCEDURE — 80061 LIPID PANEL: CPT

## 2019-04-25 PROCEDURE — 84295 ASSAY OF SERUM SODIUM: CPT

## 2019-04-25 NOTE — PROGRESS NOTES
"04/25/19    Maria Del Carmen De Los Santos  1940      CREST-2 3 YEAR FOLLOWUP    LEFT ICA STENOSIS    ARM OF STUDY:  [facility] ARM - MEDICAL MANAGEMENT ONLY      MEDICATIONS:   •  amLODIPine (NORVASC) 5 MG tablet, Take 1 tablet by mouth Daily.  •  aspirin 325 MG tablet, Take 325 mg by mouth daily.  •  enalapril (VASOTEC) 10 MG tablet, Take 1 tablet by mouth Daily.  •  METFORMIN HCL PO, Take 750 mg by mouth Daily With Breakfast.  •  metoprolol tartrate (LOPRESSOR) 100 MG tablet, Take 1 tablet by mouth Every 12 (Twelve) Hours  •  Multiple Vitamin (MULTI VITAMIN DAILY PO), Take  by mouth Daily  •  rosuvastatin (CRESTOR) 20 MG tablet, Take 20 mg by mouth Daily      AVERAGE BP: 140/62   No changes to meds at this time due to patient not taking medications that day.   Vitals:    04/25/19 1119 04/25/19 1120   BP: 143/69 125/62   BP Location: Left arm Left arm   Patient Position: Sitting Standing   Pulse: 66 69   Weight: 49.9 kg (110 lb)    Height: 165.1 cm (65\")          RECENT LABS:  Lab Results   Component Value Date    CHOL 124 04/25/2019    TRIG 103 04/25/2019    HDL 54 04/25/2019    LDL 49 04/25/2019     Lab Results   Component Value Date    CREATININE 1.18 (H) 04/25/2019     Lab Results   Component Value Date    K 4.6 04/25/2019     Lab Results   Component Value Date    HGBA1C 5.93 (H) 04/25/2019               NA                          143                                                                   04/25/2019    CAROTID DUPLEX   Interpretation Summary     · Right internal carotid velocities (non-study stent) do not meet criteria for in-stent restenosis.  · Left internal carotid velocities (study vessel) are consistent with >70% stenosis.  · Since the baseline (3/8/16) study, left ICA velocities have increased from 239 to 377. These velocities have not changed from 5/3/17 (406) and 4/17/18 (328) values.           ORDERS AND MEDICATION CHANGES: No changes.      *THERE IS NO CHARGE FOR THIS VISIT*    John Claros MD    "

## 2019-04-26 LAB
BH CV DISTAL RIGHT ICA HIDDEN LRR: 1 CM
BH CV MID RIGHT ICA HIDDEN LRR: 1 CM
BH CV VAS CAROTID RIGHT DISTAL STENT EDV: 17.5 CM/S
BH CV VAS CAROTID RIGHT DISTAL STENT PSV: 82.5 CM/S
BH CV VAS CAROTID RIGHT DISTAL TO STENT NATIVE VESSEL E: 18.3 CM/S
BH CV VAS CAROTID RIGHT DISTAL TO STENT PSV: 80.8 CM/S
BH CV VAS CAROTID RIGHT MID STENT EDV: 15.7 CM/S
BH CV VAS CAROTID RIGHT MID STENT PSV: 75.1 CM/S
BH CV VAS CAROTID RIGHT PROXIMAL STENT EDV: 14.2 CM/S
BH CV VAS CAROTID RIGHT PROXIMAL STENT PSV: 97.2 CM/S
BH CV VAS CAROTID RIGHT STENT NATIVE VESSEL PROXIMAL EDV: 10.9 CM/S
BH CV VAS CAROTID RIGHT STENT NATIVE VESSEL PROXIMAL PS: 79 CM/S
BH CV XLRA MEAS LEFT DIST CCA EDV: 30.9 CM/SEC
BH CV XLRA MEAS LEFT DIST CCA PSV: 89.3 CM/SEC
BH CV XLRA MEAS LEFT DIST ICA EDV: 27.6 CM/SEC
BH CV XLRA MEAS LEFT DIST ICA PSV: 82.2 CM/SEC
BH CV XLRA MEAS LEFT ICA/CCA RATIO: 4.22
BH CV XLRA MEAS LEFT MID CCA EDV: 23.2 CM/SEC
BH CV XLRA MEAS LEFT MID CCA PSV: 86.6 CM/SEC
BH CV XLRA MEAS LEFT MID ICA EDV: 42.1 CM/SEC
BH CV XLRA MEAS LEFT MID ICA PSV: 199.2 CM/SEC
BH CV XLRA MEAS LEFT PROX CCA EDV: 23.7 CM/SEC
BH CV XLRA MEAS LEFT PROX CCA PSV: 89.3 CM/SEC
BH CV XLRA MEAS LEFT PROX ECA EDV: 19.6 CM/SEC
BH CV XLRA MEAS LEFT PROX ECA PSV: 142.2 CM/SEC
BH CV XLRA MEAS LEFT PROX ICA EDV: 104 CM/SEC
BH CV XLRA MEAS LEFT PROX ICA PSV: 377 CM/SEC
BH CV XLRA MEAS LEFT PROX SCLA EDV: 0 CM/SEC
BH CV XLRA MEAS LEFT PROX SCLA PSV: 221 CM/SEC
BH CV XLRA MEAS LEFT VERTEBRAL A EDV: 16.4 CM/SEC
BH CV XLRA MEAS LEFT VERTEBRAL A PSV: 61.1 CM/SEC
BH CV XLRA MEAS RIGHT DIST CCA EDV: 12.6 CM/SEC
BH CV XLRA MEAS RIGHT DIST CCA PSV: 101.2 CM/SEC
BH CV XLRA MEAS RIGHT DIST ICA EDV: 18.3 CM/SEC
BH CV XLRA MEAS RIGHT DIST ICA PSV: 80.8 CM/SEC
BH CV XLRA MEAS RIGHT ICA/CCA RATIO: 0.74
BH CV XLRA MEAS RIGHT MID CCA EDV: 15.7 CM/SEC
BH CV XLRA MEAS RIGHT MID CCA PSV: 101.8 CM/SEC
BH CV XLRA MEAS RIGHT MID ICA EDV: 15.7 CM/SEC
BH CV XLRA MEAS RIGHT MID ICA PSV: 75.1 CM/SEC
BH CV XLRA MEAS RIGHT PROX CCA EDV: 12.1 CM/SEC
BH CV XLRA MEAS RIGHT PROX CCA PSV: 92.1 CM/SEC
BH CV XLRA MEAS RIGHT PROX ECA EDV: 18.9 CM/SEC
BH CV XLRA MEAS RIGHT PROX ECA PSV: 141 CM/SEC
BH CV XLRA MEAS RIGHT PROX ICA EDV: 14.2 CM/SEC
BH CV XLRA MEAS RIGHT PROX ICA PSV: 97.2 CM/SEC
BH CV XLRA MEAS RIGHT PROX SCLA EDV: 21.3 CM/SEC
BH CV XLRA MEAS RIGHT PROX SCLA PSV: 171.7 CM/SEC
BH CV XLRA MEAS RIGHT VERTEBRAL A EDV: 18.2 CM/SEC
BH CV XLRA MEAS RIGHT VERTEBRAL A PSV: 59 CM/SEC
BH CVPROX RIGHT ICA HIDDEN LRR: 1 CM
LEFT ARM BP: NORMAL MMHG
RIGHT ARM BP: NORMAL MMHG

## 2019-11-12 ENCOUNTER — DOCUMENTATION (OUTPATIENT)
Dept: CARDIOLOGY | Facility: CLINIC | Age: 79
End: 2019-11-12

## 2019-11-12 RX ORDER — ENALAPRIL MALEATE 20 MG/1
20 TABLET ORAL DAILY
Qty: 90 TABLET | Refills: 3 | Status: SHIPPED | OUTPATIENT
Start: 2019-11-12 | End: 2020-07-07 | Stop reason: SDUPTHER

## 2019-11-12 NOTE — PROGRESS NOTES
11/12/2019      Maria Del Carmen De Los Santos  1940      CREST-2 42 month FOLLOWUP    Left ICA STENOSIS    ARM OF STUDY:  VALARIE arm - Medical management only      Current Medications  •  amLODIPine (NORVASC) 5 MG tablet, Take 1 tablet by mouth Daily.  •  aspirin 325 MG tablet, Take 325 mg by mouth daily.  •  enalapril (VASOTEC) 10 MG tablet, Take 1 tablet by mouth Daily  •  METFORMIN HCL PO, Take 750 mg by mouth Daily With Breakfast  •  metoprolol tartrate (LOPRESSOR) 100 MG tablet, Take 1 tablet by mouth Every 12 (Twelve) Hours  •  Multiple Vitamin (MULTI VITAMIN DAILY PO), Take  by mouth Daily.  •  rosuvastatin (CRESTOR) 20 MG tablet, Take 20 mg by mouth Daily.    Vital signs:  AVERAGE BP(right arm sitting - study device): 142/67  Standing (if indicated): 127/67  Heart Rate: 63  Weight: 109.4  lbs    NIHSS/mrS: 0/0  No symptoms or hospitalizations.    RECENT LABS: No labs needed this visit    Declines Intervent.      ORDERS AND MEDICATION CHANGES:  Increase enalapril to 20 mg daily. Come back on 12/10/19 @ 1000 for BP check only in [facility].   Follow up per research protocol.    *THERE IS NO CHARGE FOR THIS VISIT*    Primary Care Provider: John Claros MD  PRIMARY CARDIOLOGIST:  MD Leanne Dutta RN

## 2019-11-14 DIAGNOSIS — I10 ESSENTIAL HYPERTENSION: ICD-10-CM

## 2019-11-14 DIAGNOSIS — E78.5 DYSLIPIDEMIA: ICD-10-CM

## 2019-11-14 DIAGNOSIS — I65.22 LEFT CAROTID STENOSIS: Primary | ICD-10-CM

## 2019-12-11 ENCOUNTER — DOCUMENTATION (OUTPATIENT)
Dept: CARDIOLOGY | Facility: CLINIC | Age: 79
End: 2019-12-11

## 2019-12-11 NOTE — PROGRESS NOTES
12/10/2019      Maria Del Carmen De Los Santos  1940      CREST-2 - BP check only    Left ICA STENOSIS    ARM OF STUDY:  IMM only      Current Medications  •  amLODIPine (NORVASC) 5 MG tablet, Take 1 tablet by mouth Daily  •  aspirin 325 MG tablet, Take 325 mg by mouth daily.  •  enalapril (VASOTEC) 20 MG tablet, Take 1 tablet by mouth Daily  •  METFORMIN HCL PO, Take 750 mg by mouth Daily With Breakfast.  •  metoprolol tartrate (LOPRESSOR) 100 MG tablet, Take 1 tablet by mouth Every 12 (Twelve) Hours  •  Multiple Vitamin (MULTI VITAMIN DAILY PO), Take  by mouth Daily  •  rosuvastatin (CRESTOR) 20 MG tablet, Take 20 mg by mouth Daily    Vital signs:  AVERAGE BP(right arm sitting - study device): 119/55  Standing(if indicated): 96/56  Heart Rate: 66  Weight: 106.8  lbs    NIHSS/mrS: 0/0    RECENT LABS: N/A    ORDERS AND MEDICATION CHANGES:  No changes, follow up per research protocol    *THERE IS NO CHARGE FOR THIS VISIT*    Primary Care Provider: John Claros MD Kristen S Henderson, RN

## 2020-03-19 ENCOUNTER — TELEPHONE (OUTPATIENT)
Dept: CARDIOLOGY | Facility: CLINIC | Age: 80
End: 2020-03-19

## 2020-05-26 ENCOUNTER — LAB (OUTPATIENT)
Dept: LAB | Facility: HOSPITAL | Age: 80
End: 2020-05-26

## 2020-05-26 ENCOUNTER — HOSPITAL ENCOUNTER (OUTPATIENT)
Dept: CARDIOLOGY | Facility: HOSPITAL | Age: 80
Discharge: HOME | End: 2020-05-26
Admitting: INTERNAL MEDICINE

## 2020-05-26 ENCOUNTER — OFFICE VISIT (OUTPATIENT)
Dept: CARDIOLOGY | Facility: CLINIC | Age: 80
End: 2020-05-26

## 2020-05-26 VITALS — BODY MASS INDEX: 17.57 KG/M2 | WEIGHT: 105.6 LBS

## 2020-05-26 VITALS — BODY MASS INDEX: 18.33 KG/M2 | HEIGHT: 65 IN | WEIGHT: 110.01 LBS

## 2020-05-26 DIAGNOSIS — I65.22 LEFT CAROTID STENOSIS: ICD-10-CM

## 2020-05-26 DIAGNOSIS — I10 ESSENTIAL HYPERTENSION: ICD-10-CM

## 2020-05-26 DIAGNOSIS — E78.5 DYSLIPIDEMIA: ICD-10-CM

## 2020-05-26 DIAGNOSIS — I65.22 LEFT CAROTID STENOSIS: Primary | ICD-10-CM

## 2020-05-26 LAB
BH CV MID RIGHT ICA HIDDEN LRR: 1 CM
BH CV RIGHT CCA HIDDEN LRR: 1 CM/S
BH CV VAS CAROTID RIGHT DISTAL STENT EDV: 17 CM/S
BH CV VAS CAROTID RIGHT DISTAL STENT PSV: 71.3 CM/S
BH CV VAS CAROTID RIGHT DISTAL TO STENT NATIVE VESSEL E: 17.6 CM/S
BH CV VAS CAROTID RIGHT DISTAL TO STENT PSV: 77.4 CM/S
BH CV VAS CAROTID RIGHT MID STENT EDV: 16.5 CM/S
BH CV VAS CAROTID RIGHT MID STENT PSV: 81.2 CM/S
BH CV VAS CAROTID RIGHT PROXIMAL STENT EDV: 13.2 CM/S
BH CV VAS CAROTID RIGHT PROXIMAL STENT PSV: 93.8 CM/S
BH CV VAS CAROTID RIGHT STENT NATIVE VESSEL PROXIMAL EDV: 14.3 CM/S
BH CV VAS CAROTID RIGHT STENT NATIVE VESSEL PROXIMAL PS: 88.4 CM/S
BH CV XLRA MEAS LEFT DIST CCA EDV: 22 CM/SEC
BH CV XLRA MEAS LEFT DIST CCA PSV: 80.1 CM/SEC
BH CV XLRA MEAS LEFT DIST ICA EDV: 33.8 CM/SEC
BH CV XLRA MEAS LEFT DIST ICA PSV: 103 CM/SEC
BH CV XLRA MEAS LEFT ICA/CCA RATIO: 4.58
BH CV XLRA MEAS LEFT MID CCA EDV: 20.9 CM/SEC
BH CV XLRA MEAS LEFT MID CCA PSV: 81.8 CM/SEC
BH CV XLRA MEAS LEFT MID ICA EDV: 29.5 CM/SEC
BH CV XLRA MEAS LEFT MID ICA PSV: 250 CM/SEC
BH CV XLRA MEAS LEFT PROX CCA EDV: 19.2 CM/SEC
BH CV XLRA MEAS LEFT PROX CCA PSV: 103 CM/SEC
BH CV XLRA MEAS LEFT PROX ECA EDV: 12.6 CM/SEC
BH CV XLRA MEAS LEFT PROX ECA PSV: 174 CM/SEC
BH CV XLRA MEAS LEFT PROX ICA EDV: 108 CM/SEC
BH CV XLRA MEAS LEFT PROX ICA PSV: 375 CM/SEC
BH CV XLRA MEAS LEFT PROX SCLA PSV: 194 CM/SEC
BH CV XLRA MEAS LEFT VERTEBRAL A EDV: 14.5 CM/SEC
BH CV XLRA MEAS LEFT VERTEBRAL A PSV: 65.1 CM/SEC
BH CV XLRA MEAS RIGHT CCA RATIO VEL: 105 CM/SEC
BH CV XLRA MEAS RIGHT DIST CCA EDV: 13.2 CM/SEC
BH CV XLRA MEAS RIGHT DIST CCA PSV: 93.8 CM/SEC
BH CV XLRA MEAS RIGHT DIST ICA EDV: 17.6 CM/SEC
BH CV XLRA MEAS RIGHT DIST ICA PSV: 77.4 CM/SEC
BH CV XLRA MEAS RIGHT ICA RATIO VEL: 81.2 CM/SEC
BH CV XLRA MEAS RIGHT ICA/CCA RATIO: 0.77
BH CV XLRA MEAS RIGHT MID CCA EDV: 14.8 CM/SEC
BH CV XLRA MEAS RIGHT MID CCA PSV: 104.8 CM/SEC
BH CV XLRA MEAS RIGHT MID ICA EDV: 17 CM/SEC
BH CV XLRA MEAS RIGHT MID ICA PSV: 71.3 CM/SEC
BH CV XLRA MEAS RIGHT PROX CCA EDV: 13.7 CM/SEC
BH CV XLRA MEAS RIGHT PROX CCA PSV: 108.1 CM/SEC
BH CV XLRA MEAS RIGHT PROX ECA PSV: 191.5 CM/SEC
BH CV XLRA MEAS RIGHT PROX ICA EDV: 16.5 CM/SEC
BH CV XLRA MEAS RIGHT PROX ICA PSV: 81.2 CM/SEC
BH CV XLRA MEAS RIGHT PROX SCLA PSV: 152 CM/SEC
BH CV XLRA MEAS RIGHT VERTEBRAL A EDV: 11.8 CM/SEC
BH CV XLRA MEAS RIGHT VERTEBRAL A PSV: 72.7 CM/SEC
BH CVPROX RIGHT ICA HIDDEN LRR: 1 CM
CHOLEST SERPL-MCNC: 94 MG/DL (ref 0–200)
CREAT BLD-MCNC: 1.26 MG/DL (ref 0.57–1)
GFR SERPL CREATININE-BSD FRML MDRD: 41 ML/MIN/1.73
HDLC SERPL-MCNC: 48 MG/DL (ref 40–60)
LDLC SERPL CALC-MCNC: 24 MG/DL (ref 0–100)
LDLC/HDLC SERPL: 0.5 {RATIO}
LEFT ARM BP: NORMAL MMHG
POTASSIUM BLD-SCNC: 4.8 MMOL/L (ref 3.5–5.2)
RIGHT ARM BP: NORMAL MMHG
SODIUM BLD-SCNC: 143 MMOL/L (ref 136–145)
TRIGL SERPL-MCNC: 111 MG/DL (ref 0–150)
VLDLC SERPL-MCNC: 22.2 MG/DL (ref 5–40)

## 2020-05-26 PROCEDURE — 84132 ASSAY OF SERUM POTASSIUM: CPT

## 2020-05-26 PROCEDURE — 93880 EXTRACRANIAL BILAT STUDY: CPT

## 2020-05-26 PROCEDURE — 84295 ASSAY OF SERUM SODIUM: CPT

## 2020-05-26 PROCEDURE — 93880 EXTRACRANIAL BILAT STUDY: CPT | Performed by: INTERNAL MEDICINE

## 2020-05-26 PROCEDURE — 80061 LIPID PANEL: CPT

## 2020-05-26 PROCEDURE — 82565 ASSAY OF CREATININE: CPT

## 2020-05-26 PROCEDURE — 36415 COLL VENOUS BLD VENIPUNCTURE: CPT

## 2020-05-26 RX ORDER — AMLODIPINE BESYLATE 10 MG/1
10 TABLET ORAL DAILY
Qty: 30 TABLET | Refills: 11 | Status: SHIPPED | OUTPATIENT
Start: 2020-05-26 | End: 2020-07-07 | Stop reason: SDUPTHER

## 2020-05-26 NOTE — PROGRESS NOTES
"05/26/2020      Maria Del Carmen De Los Santos  1940      CREST-2   4 year FOLLOWUP (Last visit-study completed)    Left ICA STENOSIS    ARM OF STUDY: VALARIE ARM - MEDICAL MANAGEMENT ONLY      Current Medications  •  amLODIPine (NORVASC) 5 MG tablet, Take 1 tablet by mouth Daily.  •  aspirin 325 MG tablet, Take 325 mg by mouth daily  •  enalapril (VASOTEC) 20 MG tablet, Take 1 tablet by mouth Daily  •  METFORMIN HCL PO, Take 750 mg by mouth Daily With Breakfast.  •  metoprolol tartrate (LOPRESSOR) 100 MG tablet, Take 1 tablet by mouth Every 12 (Twelve) Hours  •  Multiple Vitamin (MULTI VITAMIN DAILY PO), Take  by mouth Daily.  •  rosuvastatin (CRESTOR) 20 MG tablet, Take 20 mg by mouth Daily.    Vital signs:  AVERAGE BP(right arm sitting - study device): 134/63  Standing(if indicated): n/a   Heart Rate: 64    Vitals:    05/26/20 1203   Weight: 47.9 kg (105 lb 9.6 oz)       NIHSS/mrS: 0/0    RECENT LABS:  Lab Results   Component Value Date    CHOL 94 05/26/2020    TRIG 111 05/26/2020    HDL 48 05/26/2020    LDL 24 05/26/2020     Lab Results   Component Value Date    CREATININE 1.26 (H) 05/26/2020     Lab Results   Component Value Date    K 4.8 05/26/2020        NA                                                                  143                                   05/26/2020    CAROTID DUPLEX - 05/26/2020  Interpretation Summary   · The right internal carotid artery is stented. There is no evidence of in-stent or in segment restenosis.  · There is greater than 70% stenosis of the left internal carotid artery.  · Bilateral vertebral artery stenosis is antegrade.  · Since April 2019, the \"study\" left internal carotid artery has shown no change in velocities.     ORDERS AND MEDICATION CHANGES:  Completed CREST2 research protocol today. She will continue to follow with Dr. Jauregui annually with carotid duplex in [place]. We would advise increase in amlodipine to 10 mg daily if patient agreeable.     Seen by Diamond Munoz RN " (research coordinator).    *THERE IS NO CHARGE FOR THIS VISIT*    Primary Care Provider: John Claros MD  PRIMARY CARDIOLOGIST:  MD Leanne Dutta RN

## 2020-07-07 ENCOUNTER — OFFICE VISIT (OUTPATIENT)
Dept: CARDIOLOGY | Facility: CLINIC | Age: 80
End: 2020-07-07

## 2020-07-07 VITALS
BODY MASS INDEX: 17.83 KG/M2 | SYSTOLIC BLOOD PRESSURE: 100 MMHG | DIASTOLIC BLOOD PRESSURE: 60 MMHG | WEIGHT: 107 LBS | HEIGHT: 65 IN | OXYGEN SATURATION: 98 % | HEART RATE: 70 BPM

## 2020-07-07 DIAGNOSIS — E78.5 DYSLIPIDEMIA: ICD-10-CM

## 2020-07-07 DIAGNOSIS — I10 ESSENTIAL HYPERTENSION: ICD-10-CM

## 2020-07-07 DIAGNOSIS — I25.10 CORONARY ARTERY DISEASE INVOLVING NATIVE CORONARY ARTERY OF NATIVE HEART WITHOUT ANGINA PECTORIS: Primary | ICD-10-CM

## 2020-07-07 DIAGNOSIS — I65.22 LEFT CAROTID STENOSIS: ICD-10-CM

## 2020-07-07 PROCEDURE — 99214 OFFICE O/P EST MOD 30 MIN: CPT | Performed by: INTERNAL MEDICINE

## 2020-07-07 RX ORDER — AMLODIPINE BESYLATE 10 MG/1
10 TABLET ORAL DAILY
Qty: 90 TABLET | Refills: 3 | Status: SHIPPED | OUTPATIENT
Start: 2020-07-07 | End: 2021-07-20 | Stop reason: SDUPTHER

## 2020-07-07 RX ORDER — ENALAPRIL MALEATE 20 MG/1
20 TABLET ORAL DAILY
Qty: 90 TABLET | Refills: 3 | Status: SHIPPED | OUTPATIENT
Start: 2020-07-07 | End: 2021-07-20 | Stop reason: SDUPTHER

## 2020-07-07 RX ORDER — ROSUVASTATIN CALCIUM 20 MG/1
20 TABLET, COATED ORAL DAILY
Qty: 90 TABLET | Refills: 3 | Status: SHIPPED | OUTPATIENT
Start: 2020-07-07 | End: 2020-08-21 | Stop reason: ALTCHOICE

## 2020-07-07 RX ORDER — METOPROLOL TARTRATE 100 MG/1
100 TABLET ORAL EVERY 12 HOURS
Qty: 180 TABLET | Refills: 3 | Status: SHIPPED | OUTPATIENT
Start: 2020-07-07 | End: 2021-07-20 | Stop reason: SDUPTHER

## 2020-07-07 NOTE — PROGRESS NOTES
[facility] Cardiology    Encounter Date: 2020    Patient ID: Maria Del Carmen De Los Santos is a 80 y.o. female.  : 1940     PCP: John Claros MD       Chief Complaint: Coronary artery disease       PROBLEM LIST:  1. Coronary artery disease:  a. Symptoms of unstable angina.  b. LHC, 2010, by Dr. Zuluaga showed severe 3-vessel disease with normal LV function.   c. CABG x4,2010: CLEMENTS-LAD, SVG to the ramus intermedius, circumflex and PDA.   2. Carotid artery disease:  a. Carotid angiogram, 2010: 90% stenosis of the right ICA.   b. Stenting of the right ICA by Dr. Jauregui using 8 mm x 40 mm stent.  c. Carotid duplex, 2011: Patent right ICA stent, suspected 80% stenosis of the left ICA; asymptomatic.   d. Carotid duplex, 2012: 60-79% left ICA stenosis, patent right ICA stent.  e. Carotid duplex, 2013: Acceptable velocities; no significant change.   f. Carotid duplex, 2015, [facility]: Unclear study, essentially comments on the left ICA only with moderate elevation of velocity to 223. No symptoms.  g. Persistent bilateral carotid bruit.   h. Carotid duplex, 2018: 50-69% LICA stenosis. SELENE stent, no ISR.  i. Carotid duplex, 2019: LICA > 70% stenosis. SELENE stent without ISR.  j. Carotid duplex, 2020: >70% LICA stenosis. SELENE is stented with no evidence of ISR. Bilateral vertebral artery stenosis is antegrade  3. Hypertension.  4. Dyslipidemia.  5. DM type 2.  6. CKD    History of Present Illness  Patient presents today for a follow-up with a history of coronary artery disease, carotid disease, and cardiac risk factors. Since last visit, she has been feeling well overall from a cardiovascular standpoint. Patient denies chest pain, shortness of breath, palpitations, edema, dizziness, syncope, or any symptoms of stroke.      No Known Allergies      Current Outpatient Medications:   •  amLODIPine (NORVASC) 10 MG tablet,  "Take 1 tablet by mouth Daily., Disp: 30 tablet, Rfl: 11  •  aspirin 325 MG tablet, Take 325 mg by mouth daily., Disp: , Rfl:   •  enalapril (VASOTEC) 20 MG tablet, Take 1 tablet by mouth Daily., Disp: 90 tablet, Rfl: 3  •  metFORMIN (GLUCOPHAGE) 500 MG tablet, Take 750 mg by mouth 2 (two) times a day., Disp: , Rfl:   •  metoprolol tartrate (LOPRESSOR) 100 MG tablet, Take 1 tablet by mouth Every 12 (Twelve) Hours., Disp: 180 tablet, Rfl: 3  •  Multiple Vitamin (MULTI VITAMIN DAILY PO), Take  by mouth Daily., Disp: , Rfl:   •  rosuvastatin (CRESTOR) 20 MG tablet, Take 20 mg by mouth Daily., Disp: , Rfl:     The following portions of the patient's history were reviewed and updated as appropriate: allergies, current medications, past family history, past medical history, past social history, past surgical history and problem list.    ROS  Review of Systems   Constitution: Negative for chills, fever, fatigue, generalized weakness.   Cardiovascular: Negative for chest pain, claudication, dyspnea on exertion, leg swelling, palpitations, orthopnea, and syncope.   Respiratory: Negative for cough, shortness of breath, and wheezing.  HENT: Negative for ear pain, nosebleeds, and tinnitus.  Gastrointestinal: Negative for abdominal pain, constipation, diarrhea, nausea and vomiting.   Genitourinary: No urinary symptoms.  Musculoskeletal: Negative for muscle cramps.  Neurological: Negative for dizziness, headaches, loss of balance, numbness, and symptoms of stroke.  Psychiatric: Normal mental status.     All other systems reviewed and are negative.        Objective:     /60 (BP Location: Left arm, Patient Position: Sitting)   Pulse 70   Ht 165.1 cm (65\")   Wt 48.5 kg (107 lb)   SpO2 98%   BMI 17.81 kg/m²    Repeat BP measurement: 110/68    Physical Exam  Constitutional: Patient appears well-developed and well-nourished.   HENT: HEENT exam unremarkable.   Neck: Neck supple. No JVD present. Left carotid bruits. "   Cardiovascular: Normal rate, regular rhythm and normal heart sounds. No murmur heard.   2+ symmetric pulses.   Pulmonary/Chest: Breath sounds normal. Does not exhibit tenderness.   Abdominal: Abdomen benign.   Musculoskeletal: Does not exhibit edema.   Neurological: Neurological exam unremarkable.   Vitals reviewed.    Lab Review:     Lab Results   Component Value Date     05/26/2020    K 4.8 05/26/2020    CREATININE 1.26 (H) 05/26/2020     Lab Results   Component Value Date    CHOL 94 05/26/2020    TRIG 111 05/26/2020    HDL 48 05/26/2020    LDL 24 05/26/2020      Lab Results   Component Value Date    HGBA1C 5.93 (H) 04/25/2019         Procedures       Assessment:      Diagnosis Plan   1. Coronary artery disease involving native coronary artery of native heart without angina pectoris  Stable. H/o CABG in 2010 with no ischemic testing on file since. On aspirin for antiplatelet therapy.   2. Essential hypertension  Well-controlled, continue amlodipine, enalapril, and metoprolol.   3. Dyslipidemia  Well-controlled, continue rosuvastatin 20 mg.   4. Left carotid stenosis  Reviewed carotid duplex from 05/26/2020. Followed by Dr. Jauregui.      Plan:   Stable cardiac status overall.  Continue current medications.   FU in 12 MO, sooner as needed.  Thank you for allowing us to participate in the care of your patient.     Scribed for Amarjit Zuluaga MD by Mercy Price. 7/7/2020  13:34     I, Amarjit Zuluaga MD, personally performed the services described in this documentation as scribed by the above named individual in my presence, and it is both accurate and complete.  7/7/2020  13:44        Please note that portions of this note may have been completed with a voice recognition program. Efforts were made to edit the dictations, but occasionally words are mistranscribed.

## 2020-08-21 ENCOUNTER — TELEPHONE (OUTPATIENT)
Dept: CARDIOLOGY | Facility: CLINIC | Age: 80
End: 2020-08-21

## 2020-08-21 DIAGNOSIS — E78.5 DYSLIPIDEMIA: Primary | ICD-10-CM

## 2020-08-21 RX ORDER — PRAVASTATIN SODIUM 40 MG
40 TABLET ORAL NIGHTLY
Qty: 90 TABLET | Refills: 0 | Status: SHIPPED | OUTPATIENT
Start: 2020-08-21 | End: 2021-01-19

## 2020-08-21 RX ORDER — ATORVASTATIN CALCIUM 40 MG/1
40 TABLET, FILM COATED ORAL NIGHTLY
Qty: 90 TABLET | Refills: 0
Start: 2020-08-21 | End: 2020-08-21

## 2021-01-19 RX ORDER — PRAVASTATIN SODIUM 40 MG
40 TABLET ORAL NIGHTLY
Qty: 30 TABLET | Refills: 0 | Status: SHIPPED | OUTPATIENT
Start: 2021-01-19 | End: 2021-07-20 | Stop reason: SDUPTHER

## 2021-02-12 DIAGNOSIS — I65.22 LEFT CAROTID STENOSIS: Primary | ICD-10-CM

## 2021-05-27 ENCOUNTER — LAB (OUTPATIENT)
Dept: LAB | Facility: HOSPITAL | Age: 81
End: 2021-05-27

## 2021-05-27 ENCOUNTER — OFFICE VISIT (OUTPATIENT)
Dept: CARDIOLOGY | Facility: CLINIC | Age: 81
End: 2021-05-27

## 2021-05-27 ENCOUNTER — HOSPITAL ENCOUNTER (OUTPATIENT)
Dept: CARDIOLOGY | Facility: HOSPITAL | Age: 81
Discharge: HOME | End: 2021-05-27

## 2021-05-27 VITALS
HEART RATE: 73 BPM | WEIGHT: 108 LBS | HEIGHT: 65 IN | SYSTOLIC BLOOD PRESSURE: 128 MMHG | DIASTOLIC BLOOD PRESSURE: 68 MMHG | BODY MASS INDEX: 17.99 KG/M2

## 2021-05-27 DIAGNOSIS — I65.23 BILATERAL CAROTID ARTERY STENOSIS: ICD-10-CM

## 2021-05-27 DIAGNOSIS — E78.5 DYSLIPIDEMIA: ICD-10-CM

## 2021-05-27 DIAGNOSIS — Z95.828 PRESENCE OF INTERNAL CAROTID STENT: Primary | ICD-10-CM

## 2021-05-27 DIAGNOSIS — I10 ESSENTIAL HYPERTENSION: ICD-10-CM

## 2021-05-27 DIAGNOSIS — I65.22 LEFT CAROTID STENOSIS: ICD-10-CM

## 2021-05-27 LAB
ALBUMIN SERPL-MCNC: 4 G/DL (ref 3.5–5.2)
ALBUMIN/GLOB SERPL: 1.7 G/DL
ALP SERPL-CCNC: 57 U/L (ref 39–117)
ALT SERPL W P-5'-P-CCNC: 12 U/L (ref 1–33)
ANION GAP SERPL CALCULATED.3IONS-SCNC: 7.9 MMOL/L (ref 5–15)
AST SERPL-CCNC: 22 U/L (ref 1–32)
BILIRUB SERPL-MCNC: 0.2 MG/DL (ref 0–1.2)
BUN SERPL-MCNC: 27 MG/DL (ref 8–23)
BUN/CREAT SERPL: 19.9 (ref 7–25)
CALCIUM SPEC-SCNC: 9.9 MG/DL (ref 8.6–10.5)
CHLORIDE SERPL-SCNC: 104 MMOL/L (ref 98–107)
CHOLEST SERPL-MCNC: 136 MG/DL (ref 0–200)
CO2 SERPL-SCNC: 29.1 MMOL/L (ref 22–29)
CREAT SERPL-MCNC: 1.36 MG/DL (ref 0.57–1)
GFR SERPL CREATININE-BSD FRML MDRD: 37 ML/MIN/1.73
GLOBULIN UR ELPH-MCNC: 2.4 GM/DL
GLUCOSE SERPL-MCNC: 94 MG/DL (ref 65–99)
HDLC SERPL-MCNC: 54 MG/DL (ref 40–60)
LDLC SERPL CALC-MCNC: 61 MG/DL (ref 0–100)
LDLC/HDLC SERPL: 1.08 {RATIO}
POTASSIUM SERPL-SCNC: 5.5 MMOL/L (ref 3.5–5.2)
PROT SERPL-MCNC: 6.4 G/DL (ref 6–8.5)
SODIUM SERPL-SCNC: 141 MMOL/L (ref 136–145)
TRIGL SERPL-MCNC: 118 MG/DL (ref 0–150)
VLDLC SERPL-MCNC: 21 MG/DL (ref 5–40)

## 2021-05-27 PROCEDURE — 93880 EXTRACRANIAL BILAT STUDY: CPT

## 2021-05-27 PROCEDURE — 80061 LIPID PANEL: CPT

## 2021-05-27 PROCEDURE — 80053 COMPREHEN METABOLIC PANEL: CPT

## 2021-05-27 PROCEDURE — 99213 OFFICE O/P EST LOW 20 MIN: CPT | Performed by: INTERNAL MEDICINE

## 2021-05-27 PROCEDURE — 93880 EXTRACRANIAL BILAT STUDY: CPT | Performed by: INTERNAL MEDICINE

## 2021-05-27 PROCEDURE — 36415 COLL VENOUS BLD VENIPUNCTURE: CPT

## 2021-05-27 RX ORDER — FERROUS SULFATE 325(65) MG
325 TABLET ORAL
COMMUNITY
End: 2022-07-26

## 2021-05-27 RX ORDER — MULTIVIT WITH MINERALS/LUTEIN
500 TABLET ORAL DAILY
COMMUNITY

## 2021-05-27 NOTE — PROGRESS NOTES
OFFICE FOLLOW UP     Date of Encounter:2021     Name: Maria Del Carmen De Los Santos  : 1940  [facility] [place] [address]    PCP: Jose David Gleason MD  [address]  [place] [address]    Maria Del Carmen De Los Santos is a 80 y.o. female.      Chief Complaint: Follow up of carotid stenosis with carotid duplex today    Problem List:   1. Coronary artery disease:  a. Symptoms of unstable angina.  b. LHC, 2010, by Dr. Zuluaga showed severe 3-vessel disease with normal LV function.   c. CABG x4,2010: CLEMENTS-LAD, SVG to the ramus intermedius, circumflex and PDA.   2. Carotid artery disease:  a. Carotid angiogram, 2010: 90% stenosis of the right ICA.   b. Stenting of the right ICA by Dr. Jauregui using 8 mm x 40 mm stent.  c. Carotid duplex, 2011: Patent right ICA stent, suspected 80% stenosis of the left ICA; asymptomatic.   d. Carotid duplex, 2012: 60-79% left ICA stenosis, patent right ICA stent.  e. Carotid duplex, 2013: Acceptable velocities; no significant change.   f. Carotid duplex, 2015, [facility]: Unclear study, essentially comments on the left ICA only with moderate elevation of velocity to 223. No symptoms.  g. Persistent bilateral carotid bruit.   h. Carotid duplex, 2018: 50-69% LICA stenosis. SELENE stent, no ISR.  i. Carotid duplex, 2019: LICA > 70% stenosis. SELENE stent without ISR.  j. Carotid duplex, 2020: >70% LICA stenosis. SELENE is stented with no evidence of ISR. Bilateral vertebral artery stenosis is antegrade (Completed CREST2 follow up)  k. Duplex, 2021: 50-69 % LICA, SELENE VALARIE no ISR  3. Hypertension.  4. Dyslipidemia.  5. DM type 2.  6. CKD    Allergies:  No Known Allergies    Current Medications:  Current Outpatient Medications   Medication Instructions   • amLODIPine (NORVASC) 10 mg, Oral, Daily   • aspirin 325 mg, Oral, Daily   • enalapril (VASOTEC) 20 mg, Oral, Daily   • ferrous sulfate 325 mg, Oral, Daily With  "Breakfast   • metFORMIN (GLUCOPHAGE) 750 mg, Oral, 2 times daily   • metoprolol tartrate (LOPRESSOR) 100 mg, Oral, Every 12 Hours   • Multiple Vitamin (MULTI VITAMIN DAILY PO) Oral, Daily   • pravastatin (PRAVACHOL) 40 mg, Oral, Nightly   • vitamin C (ASCORBIC ACID) 500 mg, Oral, Daily        History of Present Illness:     Maria Del Carmen De Los Santos returns for follow up of carotid stenosis. She follows with Dr. Zuluaga for CAD. She had repeat carotid duplex today. Her rosuvastatin has been changed to pravastatin due to insurance coverage. Labs are pending today. She denies symptoms of TIA or stroke. She does not check home blood pressures. She has not had COVID vaccine.      The following portions of the patient's history were reviewed and updated as appropriate: allergies, current medications and problem list.    ROS: Pertinent positives as listed in the HPI.  All other systems reviewed and negative.    Objective:    Vitals:    05/27/21 1220 05/27/21 1222   BP: 141/60 128/68   BP Location: Right arm Right arm   Patient Position: Sitting Standing   Pulse: 67 73   Weight: 49 kg (108 lb)    Height: 165.1 cm (65\")      Body mass index is 17.97 kg/m².    Physical Exam:  GENERAL: Alert, cooperative, in no acute distress.   HEENT: Normocephalic, no adenopathy, no jugular venous distention  HEART: No discrete PMI is noted. Regular rhythm, normal rate, and no murmurs, gallops, or rubs.   LUNGS: Clear to auscultation bilaterally. No wheezing, rales or ronchi.  ABDOMEN: Soft, bowel sounds present, non-tender   NEUROLOGIC: No focal abnormalities involving strength or sensation are noted.   EXTREMITIES: No clubbing, cyanosis, or edema noted.      Diagnostic Data:  Labs drawn today are pending  Lab Results   Component Value Date    GLUCOSE 100 04/04/2016    BUN 29 (H) 04/04/2016    CREATININE 1.26 (H) 05/26/2020    EGFRIFNONA 41 (L) 05/26/2020    K 4.8 05/26/2020    CO2 28 04/04/2016    CALCIUM 9.7 04/04/2016    AST 29 04/04/2016    ALT 13 " 04/04/2016      Lab Results   Component Value Date    CHOL 94 05/26/2020    CHLPL 129 04/04/2016    TRIG 111 05/26/2020    HDL 48 05/26/2020    LDL 24 05/26/2020        Procedures    Advance Care Planning   ACP discussion was held with the patient during this visit. Patient does not have an advance directive, declines further assistance.    Assessment and Plan:   1.  Carotid stenosis (history of SELENE VALARIE and > 70% asymptomatic LICA stenosis):  Duplex shows improvement on the left and no ISR in the SELENE VALARIE. We will continue BMT.  2.  HTN:  Elevated today. We did discuss with daughter in waiting room that home blood pressures should be taken periodically and target is less than 130/80.   3.  HLD:  Lipid panel pending today. She needs high intensity statin and will attempt insurance approval if LDL not to target.  4.  CAD: Keep scheduled follow up with Dr. Zuluaga. Continue medical management.     I will see Maria Del Carmen De Los Santos back in one year with same day carotid duplex or sooner on an as needed basis.    Part of this note was scribed by Leanne Odom RN.      EMR Dragon/Transcription Disclaimer:  Much of this encounter note is an electronic transcription/translation of spoken language to printed text.  The electronic translation of spoken language may permit erroneous, or at times, nonsensical words or phrases to be inadvertently transcribed.  Although I have reviewed the note for such errors, some may still exist.

## 2021-05-28 LAB
BH CV ECHO MEAS - BSA(HAYCOCK): 1.5 M^2
BH CV ECHO MEAS - BSA: 1.5 M^2
BH CV ECHO MEAS - BZI_BMI: 17.8 KILOGRAMS/M^2
BH CV ECHO MEAS - BZI_METRIC_HEIGHT: 165.1 CM
BH CV ECHO MEAS - BZI_METRIC_WEIGHT: 48.5 KG
BH CV MID RIGHT ICA HIDDEN LRR: 1 CM
BH CV VAS CAROTID RIGHT DISTAL STENT EDV: 11 CM/S
BH CV VAS CAROTID RIGHT DISTAL STENT PSV: 57 CM/S
BH CV VAS CAROTID RIGHT DISTAL TO STENT NATIVE VESSEL E: 15 CM/S
BH CV VAS CAROTID RIGHT DISTAL TO STENT PSV: 71 CM/S
BH CV VAS CAROTID RIGHT MID STENT EDV: 14 CM/S
BH CV VAS CAROTID RIGHT MID STENT PSV: 59 CM/S
BH CV VAS CAROTID RIGHT PROXIMAL STENT EDV: 8 CM/S
BH CV VAS CAROTID RIGHT PROXIMAL STENT PSV: 67 CM/S
BH CV VAS CAROTID RIGHT STENT NATIVE VESSEL PROXIMAL EDV: 11 CM/S
BH CV VAS CAROTID RIGHT STENT NATIVE VESSEL PROXIMAL PS: 89 CM/S
BH CV XLRA MEAS LEFT DIST CCA EDV: 25.1 CM/SEC
BH CV XLRA MEAS LEFT DIST CCA PSV: 145.6 CM/SEC
BH CV XLRA MEAS LEFT DIST ICA EDV: 24.5 CM/SEC
BH CV XLRA MEAS LEFT DIST ICA PSV: 77.8 CM/SEC
BH CV XLRA MEAS LEFT ICA/CCA RATIO: 3.27
BH CV XLRA MEAS LEFT MID CCA EDV: 23.4 CM/SEC
BH CV XLRA MEAS LEFT MID CCA PSV: 110 CM/SEC
BH CV XLRA MEAS LEFT MID ICA EDV: 26 CM/SEC
BH CV XLRA MEAS LEFT MID ICA PSV: 201 CM/SEC
BH CV XLRA MEAS LEFT PROX CCA EDV: 18.2 CM/SEC
BH CV XLRA MEAS LEFT PROX CCA PSV: 110.9 CM/SEC
BH CV XLRA MEAS LEFT PROX ECA EDV: 0 CM/SEC
BH CV XLRA MEAS LEFT PROX ECA PSV: 225.8 CM/SEC
BH CV XLRA MEAS LEFT PROX ICA EDV: 92.2 CM/SEC
BH CV XLRA MEAS LEFT PROX ICA PSV: 360 CM/SEC
BH CV XLRA MEAS LEFT PROX SCLA EDV: 0 CM/SEC
BH CV XLRA MEAS LEFT PROX SCLA PSV: 220.9 CM/SEC
BH CV XLRA MEAS LEFT VERTEBRAL A EDV: 9.3 CM/SEC
BH CV XLRA MEAS LEFT VERTEBRAL A PSV: 59 CM/SEC
BH CV XLRA MEAS RIGHT DIST CCA EDV: 9.4 CM/SEC
BH CV XLRA MEAS RIGHT DIST CCA PSV: 90.9 CM/SEC
BH CV XLRA MEAS RIGHT DIST ICA EDV: 15.7 CM/SEC
BH CV XLRA MEAS RIGHT DIST ICA PSV: 73.7 CM/SEC
BH CV XLRA MEAS RIGHT ICA/CCA RATIO: 0.94
BH CV XLRA MEAS RIGHT MID CCA EDV: 12.6 CM/SEC
BH CV XLRA MEAS RIGHT MID CCA PSV: 109.4 CM/SEC
BH CV XLRA MEAS RIGHT MID ICA EDV: 14.9 CM/SEC
BH CV XLRA MEAS RIGHT MID ICA PSV: 61.9 CM/SEC
BH CV XLRA MEAS RIGHT PROX CCA EDV: 11.6 CM/SEC
BH CV XLRA MEAS RIGHT PROX CCA PSV: 116.2 CM/SEC
BH CV XLRA MEAS RIGHT PROX ECA EDV: 0 CM/SEC
BH CV XLRA MEAS RIGHT PROX ECA PSV: 216.4 CM/SEC
BH CV XLRA MEAS RIGHT PROX ICA EDV: 18 CM/SEC
BH CV XLRA MEAS RIGHT PROX ICA PSV: 101.9 CM/SEC
BH CV XLRA MEAS RIGHT PROX SCLA EDV: 0 CM/SEC
BH CV XLRA MEAS RIGHT PROX SCLA PSV: 200.3 CM/SEC
BH CV XLRA MEAS RIGHT VERTEBRAL A EDV: 14.6 CM/SEC
BH CV XLRA MEAS RIGHT VERTEBRAL A PSV: 107.3 CM/SEC
BH CVPROX RIGHT ICA HIDDEN LRR: 1 CM
LEFT ARM BP: NORMAL MMHG
RIGHT ARM BP: NORMAL MMHG

## 2021-07-19 NOTE — PROGRESS NOTES
[facility] Cardiology    Encounter Date: 2021    Patient ID: Maria Del Carmen De Los Santos is a 81 y.o. female.  : 1940     PCP: Jose David Gleason MD       Chief Complaint: Coronary Artery Disease      PROBLEM LIST:  1. Coronary artery disease:  a. Symptoms of unstable angina.  b. LHC, 2010, by Dr. Zuluaga showed severe 3-vessel disease with normal LV function.   c. CABG x4,2010: CLEMENTS-LAD, SVG to the ramus intermedius, circumflex and PDA.   2. Carotid artery disease:  a. Carotid angiogram, 2010: 90% stenosis of the right ICA.   b. Stenting of the right ICA by Dr. Jauregui using 8 mm x 40 mm stent.  c. Carotid duplex, 2011: Patent right ICA stent, suspected 80% stenosis of the left ICA; asymptomatic.   d. Carotid duplex, 2012: 60-79% left ICA stenosis, patent right ICA stent.  e. Carotid duplex, 2013: Acceptable velocities; no significant change.   f. Carotid duplex, 2015, [facility]: Unclear study, essentially comments on the left ICA only with moderate elevation of velocity to 223. No symptoms.  g. Persistent bilateral carotid bruit.   h. Carotid duplex, 2018: 50-69% LICA stenosis. SELENE stent, no ISR.  i. Carotid duplex, 2019: LICA > 70% stenosis. SELENE stent without ISR.  j. Carotid duplex, 2020: >70% LICA stenosis. SELENE is stented with no evidence of ISR. Bilateral vertebral artery stenosis is antegrade  k. Carotid duplex, 2021: No evidence of SELENE in-stent restenosis. LICA <70%. Bilateral vertebral artery flow remains antegrade.  3. Hypertension.  4. Dyslipidemia.  5. DM type 2.  6. CKD    History of Present Illness  Patient presents today for an annual follow-up with a history of coronary artery disease, carotid artery disease and cardiac risk factors. Since last visit he has done well from cardiac standpoint.  She reports occasional dizziness on standing and occasional lower extremity edema which  improves with leg elevation.. Patient otherwise denies chest pain, shortness of breath, palpitations, and syncope. Patient's amlodipine was decreased from 10 mg to 5 mg daily by another provider.       No Known Allergies      Current Outpatient Medications:   •  amLODIPine (NORVASC) 5 MG tablet, Take 1 tablet by mouth Daily., Disp: 90 tablet, Rfl: 3  •  aspirin 325 MG tablet, Take 325 mg by mouth daily., Disp: , Rfl:   •  enalapril (VASOTEC) 20 MG tablet, Take 1 tablet by mouth Daily., Disp: 90 tablet, Rfl: 3  •  ferrous sulfate 325 (65 FE) MG tablet, Take 325 mg by mouth Daily With Breakfast., Disp: , Rfl:   •  metFORMIN (GLUCOPHAGE) 500 MG tablet, Take 750 mg by mouth 2 (two) times a day., Disp: , Rfl:   •  metoprolol tartrate (LOPRESSOR) 100 MG tablet, Take 1 tablet by mouth Every 12 (Twelve) Hours., Disp: 180 tablet, Rfl: 3  •  Multiple Vitamin (MULTI VITAMIN DAILY PO), Take  by mouth Daily., Disp: , Rfl:   •  pravastatin (PRAVACHOL) 40 MG tablet, Take 1 tablet by mouth Every Night., Disp: 90 tablet, Rfl: 3  •  vitamin C (ASCORBIC ACID) 250 MG tablet, Take 500 mg by mouth Daily., Disp: , Rfl:     The following portions of the patient's history were reviewed and updated as appropriate: allergies, current medications, past family history, past medical history, past social history, past surgical history and problem list.    ROS  Review of Systems   Constitution: Negative for chills, fever, fatigue, generalized weakness.   Cardiovascular: Negative for chest pain, dyspnea on exertion, palpitations, orthopnea, and syncope. Positive for leg swelling  Respiratory: Negative for cough, shortness of breath, and wheezing.  HENT: Negative for ear pain, nosebleeds, and tinnitus.  Gastrointestinal: Negative for abdominal pain, constipation, diarrhea, nausea and vomiting.   Genitourinary: No urinary symptoms.  Musculoskeletal: Negative for muscle cramps.  Neurological: Negative for dizziness, headaches, loss of balance,  "numbness, and symptoms of stroke.  Psychiatric: Normal mental status.     All other systems reviewed and are negative.        Objective:     /64 (BP Location: Right arm, Patient Position: Sitting)   Pulse 65   Ht 165.1 cm (65\")   Wt 47.6 kg (105 lb)   BMI 17.47 kg/m²      Physical Exam  Constitutional: Patient appears well-developed and well-nourished.   HENT: HEENT exam unremarkable.   Neck: Neck supple. No JVD present. No carotid bruits.   Cardiovascular: Normal rate, regular rhythm and normal heart sounds. No murmur heard.   2+ symmetric pulses.   Pulmonary/Chest: Breath sounds normal. Does not exhibit tenderness.   Abdominal: Abdomen benign.   Musculoskeletal: Does not exhibit edema.   Neurological: Neurological exam unremarkable.   Vitals reviewed.    Data Review:   Lab Results   Component Value Date    GLUCOSE 94 05/27/2021    BUN 27 (H) 05/27/2021    CREATININE 1.36 (H) 05/27/2021    EGFRIFNONA 37 (L) 05/27/2021    BCR 19.9 05/27/2021    K 5.5 (H) 05/27/2021    CO2 29.1 (H) 05/27/2021    CALCIUM 9.9 05/27/2021    ALBUMIN 4.00 05/27/2021    AST 22 05/27/2021    ALT 12 05/27/2021     Lab Results   Component Value Date    CHOL 136 05/27/2021    TRIG 118 05/27/2021    HDL 54 05/27/2021    LDL 61 05/27/2021             Procedures       Assessment:      Diagnosis Plan   1. Coronary artery disease involving native coronary artery of native heart without angina pectoris  Stable without current anginal symptoms; continue aspirin 325 mg   2. Essential hypertension  Acceptable control; continue enalapril 20 mg, amlodipine 5 mg, and metoprolol 100 mg   3. Dyslipidemia  Well controlled; continue on pravastatin 40 mg   4. Bilateral carotid artery stenosis, status post catheter-based intervention. Carotid duplex from May 2021 displayed acceptable results, patient is monitored by Dr. Jauregui/[facility] team.      Plan:   Stable cardiac status.  Advised to maintain good hydration for occasional postural " dizziness.  Use support stockings and leg elevation for occasional mild edema.  Continue current medications.   FU in 12 MO, sooner as needed.  Thank you for allowing us to participate in the care of your patient.     Scribed for Amarjit Zuluaga MD by Peggy Ngo. 7/20/2021 12:28 EDT    I, Amarjit Zuluaga MD, personally performed the services described in this documentation as scribed by the above named individual in my presence, and it is both accurate and complete.  7/23/2021  05:54 EDT        Please note that portions of this note may have been completed with a voice recognition program. Efforts were made to edit the dictations, but occasionally words are mistranscribed.

## 2021-07-20 ENCOUNTER — OFFICE VISIT (OUTPATIENT)
Dept: CARDIOLOGY | Facility: CLINIC | Age: 81
End: 2021-07-20

## 2021-07-20 VITALS
HEIGHT: 65 IN | WEIGHT: 105 LBS | SYSTOLIC BLOOD PRESSURE: 146 MMHG | HEART RATE: 65 BPM | BODY MASS INDEX: 17.49 KG/M2 | DIASTOLIC BLOOD PRESSURE: 64 MMHG

## 2021-07-20 DIAGNOSIS — E78.5 DYSLIPIDEMIA: ICD-10-CM

## 2021-07-20 DIAGNOSIS — I10 ESSENTIAL HYPERTENSION: ICD-10-CM

## 2021-07-20 DIAGNOSIS — I65.23 BILATERAL CAROTID ARTERY STENOSIS: ICD-10-CM

## 2021-07-20 DIAGNOSIS — I25.10 CORONARY ARTERY DISEASE INVOLVING NATIVE CORONARY ARTERY OF NATIVE HEART WITHOUT ANGINA PECTORIS: Primary | ICD-10-CM

## 2021-07-20 PROCEDURE — 99214 OFFICE O/P EST MOD 30 MIN: CPT | Performed by: INTERNAL MEDICINE

## 2021-07-20 RX ORDER — ENALAPRIL MALEATE 20 MG/1
20 TABLET ORAL DAILY
Qty: 90 TABLET | Refills: 3 | Status: SHIPPED | OUTPATIENT
Start: 2021-07-20

## 2021-07-20 RX ORDER — PRAVASTATIN SODIUM 40 MG
40 TABLET ORAL NIGHTLY
Qty: 90 TABLET | Refills: 3 | Status: SHIPPED | OUTPATIENT
Start: 2021-07-20

## 2021-07-20 RX ORDER — AMLODIPINE BESYLATE 5 MG/1
5 TABLET ORAL DAILY
Qty: 90 TABLET | Refills: 3 | Status: SHIPPED | OUTPATIENT
Start: 2021-07-20

## 2021-07-20 RX ORDER — METOPROLOL TARTRATE 100 MG/1
100 TABLET ORAL EVERY 12 HOURS
Qty: 180 TABLET | Refills: 3 | Status: SHIPPED | OUTPATIENT
Start: 2021-07-20

## 2022-07-25 NOTE — PROGRESS NOTES
[facility] Cardiology    Encounter Date: 2022    Patient ID: Maria Del Carmen De Los Santos is a 82 y.o. female.  : 1940     PCP: Jose David Gleason MD       Chief Complaint: Coronary Artery Disease      PROBLEM LIST:  1. Coronary artery disease:  a. Symptoms of unstable angina.  b. LHC, 2010, by Dr. Zuluaga showed severe 3-vessel disease with normal LV function.   c. CABG x4,2010: CLEMENTS-LAD, SVG to the ramus intermedius, circumflex and PDA.   2. Carotid artery disease:  a. Carotid angiogram, 2010: 90% stenosis of the right ICA.   b. Stenting of the right ICA by Dr. Jauregui using 8 mm x 40 mm stent.  c. Carotid duplex, 2011: Patent right ICA stent, suspected 80% stenosis of the left ICA; asymptomatic.   d. Carotid duplex, 2012: 60-79% left ICA stenosis, patent right ICA stent.  e. Carotid duplex, 2013: Acceptable velocities; no significant change.   f. Carotid duplex, 2015, [facility]: Unclear study, essentially comments on the left ICA only with moderate elevation of velocity to 223. No symptoms.  g. Persistent bilateral carotid bruit.   h. Carotid duplex, 2018: 50-69% LICA stenosis. SELENE stent, no ISR.  i. Carotid duplex, 2019: LICA > 70% stenosis. SELENE stent without ISR.  j. Carotid duplex, 2020: >70% LICA stenosis. SELENE is stented with no evidence of ISR. Bilateral vertebral artery stenosis is antegrade  k. Carotid duplex, 2021: No evidence of SELENE in-stent restenosis. LICA <70%. Bilateral vertebral artery flow remains antegrade.  3. Hypertension.  4. Dyslipidemia.  5. DM type 2.  6. CKD       History of Present Illness  Patient presents today for an annual follow-up with a history of coronary artery disease, carotid artery disease and cardiac risk factors. Since last visit, the patient has been doing well overall from a cardiovascular standpoint. She does not have a regular exercise routine but is able to  do her housework without any limitations. Patient has had no interim ER visits, hospitalizations, serious illnesses, or injuries. Patient denies chest pain, shortness of breath, orthopnea, palpitations, edema, dizziness, and syncope.      No Known Allergies      Current Outpatient Medications:   •  amLODIPine (NORVASC) 5 MG tablet, Take 1 tablet by mouth Daily., Disp: 90 tablet, Rfl: 3  •  aspirin 325 MG tablet, Take 325 mg by mouth daily., Disp: , Rfl:   •  enalapril (VASOTEC) 20 MG tablet, Take 1 tablet by mouth Daily., Disp: 90 tablet, Rfl: 3  •  metFORMIN (GLUCOPHAGE) 500 MG tablet, Take 750 mg by mouth 2 (two) times a day., Disp: , Rfl:   •  metoprolol tartrate (LOPRESSOR) 100 MG tablet, Take 1 tablet by mouth Every 12 (Twelve) Hours., Disp: 180 tablet, Rfl: 3  •  Multiple Vitamin (MULTI VITAMIN DAILY PO), Take 1 tablet by mouth Daily., Disp: , Rfl:   •  pravastatin (PRAVACHOL) 40 MG tablet, Take 1 tablet by mouth Every Night., Disp: 90 tablet, Rfl: 3  •  vitamin C (ASCORBIC ACID) 250 MG tablet, Take 500 mg by mouth Daily., Disp: , Rfl:     The following portions of the patient's history were reviewed and updated as appropriate: allergies, current medications, past family history, past medical history, past social history, past surgical history and problem list.    ROS  Review of Systems   Constitution: Negative for chills, fever, fatigue, generalized weakness.   Cardiovascular: Negative for chest pain, dyspnea on exertion, leg swelling, palpitations, orthopnea, and syncope.   Respiratory: Negative for cough, shortness of breath, and wheezing.  HENT: Negative for ear pain, nosebleeds, and tinnitus.  Gastrointestinal: Negative for abdominal pain, constipation, diarrhea, nausea and vomiting.   Genitourinary: No urinary symptoms.  Musculoskeletal: Negative for muscle cramps.  Neurological: Negative for dizziness, headaches, loss of balance, numbness, and symptoms of stroke.  Psychiatric: Normal mental status.  "    All other systems reviewed and are negative.        Objective:     /64 (BP Location: Right arm, Patient Position: Sitting)   Pulse 69   Ht 165.1 cm (65\")   Wt 51.6 kg (113 lb 12.8 oz)   SpO2 97%   BMI 18.94 kg/m²      Physical Exam  Constitutional: Patient appears well-developed and well-nourished.   HENT: HEENT exam unremarkable.   Neck: Neck supple. No JVD present. No carotid bruits.   Cardiovascular: Normal rate, regular rhythm and normal heart sounds. No murmur heard.   2+ symmetric pulses.   Pulmonary/Chest: Breath sounds normal. Does not exhibit tenderness.   Abdominal: Abdomen benign.   Musculoskeletal: Does not exhibit edema.   Neurological: Neurological exam unremarkable.   Vitals reviewed.    Data Review:     Lab Results   Component Value Date    CHOL 136 05/27/2021    TRIG 118 05/27/2021    HDL 54 05/27/2021    LDL 61 05/27/2021             ECG 12 Lead    Date/Time: 7/26/2022 12:18 PM  Performed by: Amarjit Zuluaga MD  Authorized by: Amarjit Zuluaga MD   Previous ECG: no previous ECG available  Rhythm: sinus rhythm  BPM: 68  Conduction: right bundle branch block  Other findings: non-specific ST-T wave changes    Clinical impression: abnormal EKG  Comments: Normal QT                     Assessment:      Diagnosis Plan   1. Coronary artery disease, status post CABG, stable.   Stable without angina on current activity. Continue on aspirin 325 mg for antiplatelet therapy.  Continue metoprolol and statin therapy.   2. Bilateral carotid artery stenosis, status post stent with acceptable follow-up ultrasound. Stable and asymptomatic.  Continue current GDMT.   3. Essential hypertension  Well controlled. Continue on amlodipine 5 mg daily, enalapril 20 mg daily, and metoprolol 100 mg twice daily.    4. Dyslipidemia  Well controlled. Continue on pravastatin 40 mg.      Plan:   Stable cardiac status.  No current angina or CHF symptoms.  Continue current medications, maintain active lifestyle and heart " healthy diet.  FU in 12 MO, sooner as needed.  Thank you for allowing us to participate in the care of your patient.       Scribed for Amarjit Zuluaga MD by Ynes Acosta. 7/26/2022 12:25 EDT         I, Amarjit Zuluaga MD, personally performed the services described in this documentation as scribed by the above named individual in my presence, and it is both accurate and complete.  7/28/2022  06:31 EDT        Please note that portions of this note may have been completed with a voice recognition program. Efforts were made to edit the dictations, but occasionally words are mistranscribed.

## 2022-07-26 ENCOUNTER — OFFICE VISIT (OUTPATIENT)
Dept: CARDIOLOGY | Facility: CLINIC | Age: 82
End: 2022-07-26

## 2022-07-26 VITALS
BODY MASS INDEX: 18.96 KG/M2 | OXYGEN SATURATION: 97 % | HEIGHT: 65 IN | SYSTOLIC BLOOD PRESSURE: 116 MMHG | DIASTOLIC BLOOD PRESSURE: 64 MMHG | HEART RATE: 69 BPM | WEIGHT: 113.8 LBS

## 2022-07-26 DIAGNOSIS — I25.10 CORONARY ARTERY DISEASE INVOLVING NATIVE CORONARY ARTERY OF NATIVE HEART WITHOUT ANGINA PECTORIS: Primary | ICD-10-CM

## 2022-07-26 DIAGNOSIS — I10 ESSENTIAL HYPERTENSION: ICD-10-CM

## 2022-07-26 DIAGNOSIS — I65.23 BILATERAL CAROTID ARTERY STENOSIS: ICD-10-CM

## 2022-07-26 DIAGNOSIS — E78.5 DYSLIPIDEMIA: ICD-10-CM

## 2022-07-26 PROCEDURE — 93000 ELECTROCARDIOGRAM COMPLETE: CPT | Performed by: INTERNAL MEDICINE

## 2022-07-26 PROCEDURE — 99214 OFFICE O/P EST MOD 30 MIN: CPT | Performed by: INTERNAL MEDICINE

## 2023-09-26 ENCOUNTER — OFFICE VISIT (OUTPATIENT)
Dept: CARDIOLOGY | Facility: CLINIC | Age: 83
End: 2023-09-26
Payer: MEDICARE

## 2023-09-26 VITALS
WEIGHT: 110.8 LBS | DIASTOLIC BLOOD PRESSURE: 73 MMHG | OXYGEN SATURATION: 96 % | SYSTOLIC BLOOD PRESSURE: 125 MMHG | HEIGHT: 64 IN | BODY MASS INDEX: 18.92 KG/M2 | HEART RATE: 68 BPM

## 2023-09-26 DIAGNOSIS — I10 ESSENTIAL HYPERTENSION: ICD-10-CM

## 2023-09-26 DIAGNOSIS — I65.23 BILATERAL CAROTID ARTERY STENOSIS: ICD-10-CM

## 2023-09-26 DIAGNOSIS — E78.5 DYSLIPIDEMIA: ICD-10-CM

## 2023-09-26 DIAGNOSIS — I25.810 CORONARY ARTERY DISEASE INVOLVING CORONARY BYPASS GRAFT OF NATIVE HEART WITHOUT ANGINA PECTORIS: Primary | ICD-10-CM

## 2023-09-26 NOTE — PROGRESS NOTES
[facility] Cardiology    Encounter Date: 2023    Patient ID: Maria Del Carmen De Los Santos is a 83 y.o. female.  : 1940     PCP: Purvi Elias APRN       Chief Complaint: Coronary Artery Disease      PROBLEM LIST:  Coronary artery disease:  Symptoms of unstable angina.  LHC, 2010, by Dr. Zuluaga showed severe 3-vessel disease with normal LV function.   CABG x4,2010: CLEMENTS-LAD, SVG to the ramus intermedius, circumflex and PDA.   Carotid artery disease:  Carotid angiogram, 2010: 90% stenosis of the right ICA.   Stenting of the right ICA by Dr. Jauregui using 8 mm x 40 mm stent.  Carotid duplex, 2011: Patent right ICA stent, suspected 80% stenosis of the left ICA; asymptomatic.   Carotid duplex, 2012: 60-79% left ICA stenosis, patent right ICA stent.  Carotid duplex, 2013: Acceptable velocities; no significant change.   Carotid duplex, 2015, [facility]: Unclear study, essentially comments on the left ICA only with moderate elevation of velocity to 223. No symptoms.  Persistent bilateral carotid bruit.   Carotid duplex, 2018: 50-69% LICA stenosis. SELENE stent, no ISR.  Carotid duplex, 2019: LICA > 70% stenosis. SELENE stent without ISR.  Carotid duplex, 2020: >70% LICA stenosis. SELENE is stented with no evidence of ISR. Bilateral vertebral artery stenosis is antegrade  Carotid duplex, 2021: No evidence of SELENE in-stent restenosis. LICA <70%. Bilateral vertebral artery flow remains antegrade.  Hypertension.  Dyslipidemia.  DM type 2.  CKD    History of Present Illness  Patient presents today for a 1 year follow-up with a history of coronary artery disease, carotid artery disease and cardiac risk factors. Since last visit, patient has been doing well overall from a cardiovascular standpoint. She does most of her house work, cooking, and cleaning. She does state that she gets tired while doing her dishes but that  "this has been stable and has not progressed. She spends most of her day watching TV. She denies any chest pain, worsening shortness of breath, palpitations, orthopnea, edema, presyncope or syncope. She is accompanied by her daughter and asks if it is okay for her to take 800mg of ibuprofen.         No Known Allergies      Current Outpatient Medications:     amLODIPine (NORVASC) 5 MG tablet, Take 1 tablet by mouth Daily., Disp: 90 tablet, Rfl: 3    aspirin 325 MG EC tablet, Take 1 tablet by mouth Daily., Disp: , Rfl:     enalapril (VASOTEC) 20 MG tablet, Take 1 tablet by mouth Daily. (Patient taking differently: Take 0.5 tablets by mouth Daily.), Disp: 90 tablet, Rfl: 3    metFORMIN (GLUCOPHAGE) 500 MG tablet, Take 1 tablet by mouth 2 (two) times a day. 1 full tab in the morning 0.5 tab at night, Disp: , Rfl:     metoprolol tartrate (LOPRESSOR) 100 MG tablet, Take 1 tablet by mouth Every 12 (Twelve) Hours., Disp: 180 tablet, Rfl: 3    Multiple Vitamin (MULTI VITAMIN DAILY PO), Take 1 tablet by mouth Daily., Disp: , Rfl:     pravastatin (PRAVACHOL) 40 MG tablet, Take 1 tablet by mouth Every Night., Disp: 90 tablet, Rfl: 3    The following portions of the patient's history were reviewed and updated as appropriate: allergies, current medications, past family history, past medical history, past social history, past surgical history and problem list.    ROS  Review of Systems   14 point ROS negative except for that listed in the HPI.         Objective:     /73 (BP Location: Right arm, Patient Position: Sitting)   Pulse 68   Ht 162.6 cm (64\")   Wt 50.3 kg (110 lb 12.8 oz)   SpO2 96%   BMI 19.02 kg/m²      Physical Exam  Constitutional: Patient appears well-developed and well-nourished.   HENT: HEENT exam unremarkable.   Neck: Neck supple. No JVD present. No carotid bruits.   Cardiovascular: Normal rate, regular rhythm and normal heart sounds. No murmur heard.   2+ symmetric pulses.   Pulmonary/Chest: Breath " sounds normal. Does not exhibit tenderness.   Abdominal: Abdomen benign.   Musculoskeletal: Does not exhibit edema.   Neurological: Neurological exam unremarkable.   Vitals reviewed.    Data Review:   Lab Results   Component Value Date    GLUCOSE 94 05/27/2021    BUN 27 (H) 05/27/2021    CREATININE 1.36 (H) 05/27/2021    BCR 19.9 05/27/2021     05/27/2021    K 5.5 (H) 05/27/2021    CO2 29.1 (H) 05/27/2021    CALCIUM 9.9 05/27/2021    ALBUMIN 4.00 05/27/2021    AST 22 05/27/2021    ALT 12 05/27/2021     Lab Results   Component Value Date    CHOL 136 05/27/2021    CHLPL 129 04/04/2016    TRIG 118 05/27/2021    HDL 54 05/27/2021    LDL 61 05/27/2021      Lab Results   Component Value Date    WBC 6.86 04/04/2016    RBC 3.93 04/04/2016    HGB 12.0 04/04/2016    HCT 36.0 04/04/2016    MCV 91.6 04/04/2016     04/04/2016     No results found for: TSH  Lab Results   Component Value Date    HGBA1C 5.93 (H) 04/25/2019        Procedures             Assessment:      Diagnosis Plan   1. Coronary artery disease involving coronary bypass graft of native heart without angina pectoris  Stable without angina on current activity. Continue on aspirin 325 mg for antiplatelet therapy.  Continue metoprolol and statin therapy.       2. Bilateral carotid artery stenosis  Stable and asymptomatic.  Continue current GDMT.       3. Essential hypertension  Well controlled. Continue on amlodipine 5 mg daily, enalapril 10 mg daily, and metoprolol 100 mg twice daily.        4. Dyslipidemia  Well controlled. Continue on pravastatin 40 mg.          Plan:   Stable cardiac status.  No current angina or CHF symptoms.   Discussed that she can occasionally take low dose ibuprofen, but that she should not take 800mg tablets. She can use tylenol as needed for pain.  Continue current medications.   FU in 12 MO, sooner as needed.  Thank you for allowing us to participate in the care of your patient.         Mel Aldana PA-C        Please  note that portions of this note may have been completed with a voice recognition program. Efforts were made to edit the dictations, but occasionally words are mistranscribed.

## 2024-10-08 ENCOUNTER — TELEPHONE (OUTPATIENT)
Dept: CARDIOLOGY | Facility: CLINIC | Age: 84
End: 2024-10-08
Payer: MEDICARE

## 2024-10-08 ENCOUNTER — OFFICE VISIT (OUTPATIENT)
Dept: CARDIOLOGY | Facility: CLINIC | Age: 84
End: 2024-10-08
Payer: MEDICARE

## 2024-10-08 VITALS
HEART RATE: 65 BPM | BODY MASS INDEX: 18.85 KG/M2 | HEIGHT: 64 IN | WEIGHT: 110.4 LBS | SYSTOLIC BLOOD PRESSURE: 118 MMHG | DIASTOLIC BLOOD PRESSURE: 60 MMHG | OXYGEN SATURATION: 98 %

## 2024-10-08 DIAGNOSIS — I10 ESSENTIAL HYPERTENSION: ICD-10-CM

## 2024-10-08 DIAGNOSIS — I65.23 BILATERAL CAROTID ARTERY STENOSIS: ICD-10-CM

## 2024-10-08 DIAGNOSIS — I25.810 CORONARY ARTERY DISEASE INVOLVING CORONARY BYPASS GRAFT OF NATIVE HEART WITHOUT ANGINA PECTORIS: Primary | ICD-10-CM

## 2024-10-08 DIAGNOSIS — E78.5 DYSLIPIDEMIA: ICD-10-CM

## 2024-10-08 PROCEDURE — 99214 OFFICE O/P EST MOD 30 MIN: CPT | Performed by: INTERNAL MEDICINE

## 2024-10-08 PROCEDURE — 1159F MED LIST DOCD IN RCRD: CPT | Performed by: INTERNAL MEDICINE

## 2024-10-08 PROCEDURE — 1160F RVW MEDS BY RX/DR IN RCRD: CPT | Performed by: INTERNAL MEDICINE

## 2024-10-08 RX ORDER — SITAGLIPTIN 50 MG/1
50 TABLET, FILM COATED ORAL DAILY
COMMUNITY
Start: 2024-09-17

## 2024-10-08 RX ORDER — TRAMADOL HYDROCHLORIDE 50 MG/1
50 TABLET ORAL DAILY PRN
COMMUNITY
Start: 2024-07-03

## 2024-10-08 NOTE — TELEPHONE ENCOUNTER
----- Message from Matthias SETHI sent at 10/8/2024 11:17 AM EDT -----  Please order carotid duplex to be done at [facility] or [place].      Sander Lua and Bonita Jimenez aware for scheduling purposes

## 2024-10-08 NOTE — PROGRESS NOTES
[facility] Cardiology    Encounter Date: 10/08/2024    Patient ID: Maria Del Carmen De Los Santos is a 84 y.o. female.  : 1940     PCP: Purvi Elias APRN       Chief Complaint: Coronary Artery Disease (Coronary artery disease involving native coronary artery of native heart without angina pectoris//)      PROBLEM LIST:  Coronary artery disease:  Symptoms of unstable angina.  LHC, 2010, by Dr. Zulugaa showed severe 3-vessel disease with normal LV function.   CABG x4,2010: CLEMENTS-LAD, SVG to the ramus intermedius, circumflex and PDA.   Carotid artery disease:  Carotid angiogram, 2010: 90% stenosis of the right ICA.   Stenting of the right ICA by Dr. Jauregui using 8 mm x 40 mm stent.  Carotid duplex, 2011: Patent right ICA stent, suspected 80% stenosis of the left ICA; asymptomatic.   Carotid duplex, 2012: 60-79% left ICA stenosis, patent right ICA stent.  Carotid duplex, 2013: Acceptable velocities; no significant change.   Carotid duplex, 2015, [facility]: Unclear study, essentially comments on the left ICA only with moderate elevation of velocity to 223. No symptoms.  Persistent bilateral carotid bruit.   Carotid duplex, 2018: 50-69% LICA stenosis. SELENE stent, no ISR.  Carotid duplex, 2019: LICA > 70% stenosis. SELENE stent without ISR.  Carotid duplex, 2020: >70% LICA stenosis. SELENE is stented with no evidence of ISR. Bilateral vertebral artery stenosis is antegrade  Carotid duplex, 2021: No evidence of SELENE in-stent restenosis. LICA <70%. Bilateral vertebral artery flow remains antegrade.  Hypertension.  Dyslipidemia.  DM type 2.  CKD    History of Present Illness  Patient presents today for a follow-up with a history of CAD, carotid duplex, and cardiac risk factors. Since last visit, patient has been doing well overall from a cardiovascular standpoint. She stays busy and active by walking and doing  "housework. Patient denies chest pain, shortness of breath, orthopnea, palpitations, edema, dizziness, and syncope.     No Known Allergies      Current Outpatient Medications:     amLODIPine (NORVASC) 5 MG tablet, Take 1 tablet by mouth Daily., Disp: 90 tablet, Rfl: 3    aspirin 325 MG EC tablet, Take 1 tablet by mouth Daily., Disp: , Rfl:     enalapril (VASOTEC) 20 MG tablet, Take 1 tablet by mouth Daily. (Patient taking differently: Take 0.5 tablets by mouth Daily.), Disp: 90 tablet, Rfl: 3    Januvia 50 MG tablet, Take 1 tablet by mouth Daily., Disp: , Rfl:     metoprolol tartrate (LOPRESSOR) 100 MG tablet, Take 1 tablet by mouth Every 12 (Twelve) Hours., Disp: 180 tablet, Rfl: 3    Multiple Vitamin (MULTI VITAMIN DAILY PO), Take 1 tablet by mouth Daily., Disp: , Rfl:     pravastatin (PRAVACHOL) 40 MG tablet, Take 1 tablet by mouth Every Night., Disp: 90 tablet, Rfl: 3    traMADol (ULTRAM) 50 MG tablet, Take 1 tablet by mouth Daily As Needed., Disp: , Rfl:     metFORMIN (GLUCOPHAGE) 500 MG tablet, Take 1 tablet by mouth 2 (two) times a day. 1 full tab in the morning 0.5 tab at night (Patient not taking: Reported on 10/8/2024), Disp: , Rfl:     The following portions of the patient's history were reviewed and updated as appropriate: allergies, current medications, past family history, past medical history, past social history, past surgical history and problem list.    ROS  Review of Systems   14 point ROS negative except for that listed in the HPI.         Objective:     /60 (BP Location: Right arm, Patient Position: Sitting, Cuff Size: Adult)   Pulse 65   Ht 162.6 cm (64\")   Wt 50.1 kg (110 lb 6.4 oz)   SpO2 98%   BMI 18.95 kg/m²      Physical Exam  Constitutional: Patient appears well-developed and well-nourished.   HENT: HEENT exam unremarkable.   Neck: Neck supple. No JVD present. No carotid bruits.   Cardiovascular: Normal rate, regular rhythm and normal heart sounds. No murmur heard.   2+ " symmetric pulses.   Pulmonary/Chest: Breath sounds normal. Does not exhibit tenderness.   Abdominal: Abdomen benign.   Musculoskeletal: Does not exhibit edema.   Neurological: Neurological exam unremarkable.   Vitals reviewed.    Data Review:   No recent laboratory studies available for review today.     Procedures       Advance Care Planning   ACP discussion was declined by the patient. Patient does not have an advance directive, declines further assistance.           Assessment:      Diagnosis Plan   1. Coronary artery disease involving coronary bypass graft of native heart without angina pectoris  Stable without angina on current activity. Continue on aspirin 325 mg for antiplatelet therapy.       2. Bilateral carotid artery stenosis  Stable and asymptomatic.  Carotid duplex ordered for assessment.       3. Essential hypertension  Well controlled. Continue on amlodipine 5 mg daily for hypertension. Continue on metoprolol 100 mg BID for rate control and hypertension.       4. Dyslipidemia  No labs for review. Continue on pravastatin 40 mg daily for hyperlipidemia.  Follow-up with PCP for monitoring.        Plan:   Stable cardiac status.  No angina or CHF symptoms, fair exercise capacity.  Carotid duplex ordered for follow-up assessment.   Continue current medications.   FU in 12 MO, sooner as needed.  Thank you for allowing us to participate in the care of your patient.     Scribed for Amarjit Zuluaga MD by Matthias Edgar. 10/8/2024 11:16 EDT    I, Amarjit Zuluaga MD, personally performed the services described in this documentation as scribed by the above named individual in my presence, and it is both accurate and complete.  10/9/2024  20:26 EDT      Please note that portions of this note may have been completed with a voice recognition program. Efforts were made to edit the dictations, but occasionally words are mistranscribed.

## 2024-11-01 ENCOUNTER — HOSPITAL ENCOUNTER (OUTPATIENT)
Facility: HOSPITAL | Age: 84
Discharge: HOME | End: 2024-11-01
Payer: MEDICARE

## 2024-11-01 VITALS — WEIGHT: 110.45 LBS | HEIGHT: 64 IN | BODY MASS INDEX: 18.86 KG/M2

## 2024-11-01 DIAGNOSIS — I65.23 BILATERAL CAROTID ARTERY STENOSIS: ICD-10-CM

## 2024-11-01 LAB
BH CV MID RIGHT ICA HIDDEN LRR: 1 CM
BH CV RIGHT CCA HIDDEN LRR: 1 CM/S
BH CV VAS CAROTID RIGHT DISTAL STENT EDV: 11.8 CM/S
BH CV VAS CAROTID RIGHT DISTAL STENT PSV: 55.3 CM/S
BH CV VAS CAROTID RIGHT DISTAL TO STENT NATIVE VESSEL E: 13 CM/S
BH CV VAS CAROTID RIGHT DISTAL TO STENT PSV: 65.2 CM/S
BH CV VAS CAROTID RIGHT MID STENT EDV: 17.4 CM/S
BH CV VAS CAROTID RIGHT MID STENT PSV: 59 CM/S
BH CV VAS CAROTID RIGHT PROXIMAL STENT EDV: 12.4 CM/S
BH CV VAS CAROTID RIGHT PROXIMAL STENT PSV: 70.2 CM/S
BH CV VAS CAROTID RIGHT STENT NATIVE VESSEL PROXIMAL EDV: 14.3 CM/S
BH CV VAS CAROTID RIGHT STENT NATIVE VESSEL PROXIMAL PS: 82 CM/S
BH CV XLRA MEAS LEFT DIST CCA EDV: 11.2 CM/SEC
BH CV XLRA MEAS LEFT DIST CCA PSV: 93.2 CM/SEC
BH CV XLRA MEAS LEFT DIST ICA EDV: 18.4 CM/SEC
BH CV XLRA MEAS LEFT DIST ICA PSV: 83.3 CM/SEC
BH CV XLRA MEAS LEFT ICA/CCA RATIO: 4.7
BH CV XLRA MEAS LEFT MID CCA EDV: 21 CM/SEC
BH CV XLRA MEAS LEFT MID CCA PSV: 86.2 CM/SEC
BH CV XLRA MEAS LEFT MID ICA EDV: 46.5 CM/SEC
BH CV XLRA MEAS LEFT MID ICA PSV: 225 CM/SEC
BH CV XLRA MEAS LEFT PROX CCA EDV: 20.3 CM/SEC
BH CV XLRA MEAS LEFT PROX CCA PSV: 79.9 CM/SEC
BH CV XLRA MEAS LEFT PROX ECA PSV: 162 CM/SEC
BH CV XLRA MEAS LEFT PROX ICA EDV: 80 CM/SEC
BH CV XLRA MEAS LEFT PROX ICA PSV: 405 CM/SEC
BH CV XLRA MEAS LEFT PROX SCLA PSV: 146 CM/SEC
BH CV XLRA MEAS LEFT VERTEBRAL A EDV: 9.9 CM/SEC
BH CV XLRA MEAS LEFT VERTEBRAL A PSV: 52.7 CM/SEC
BH CV XLRA MEAS RIGHT DIST CCA EDV: 14.3 CM/SEC
BH CV XLRA MEAS RIGHT DIST CCA PSV: 82 CM/SEC
BH CV XLRA MEAS RIGHT DIST ICA EDV: 18 CM/SEC
BH CV XLRA MEAS RIGHT DIST ICA PSV: 72.7 CM/SEC
BH CV XLRA MEAS RIGHT ICA/CCA RATIO: 0.85
BH CV XLRA MEAS RIGHT MID CCA EDV: 12.4 CM/SEC
BH CV XLRA MEAS RIGHT MID CCA PSV: 85.1 CM/SEC
BH CV XLRA MEAS RIGHT MID ICA EDV: 11.8 CM/SEC
BH CV XLRA MEAS RIGHT MID ICA PSV: 55.3 CM/SEC
BH CV XLRA MEAS RIGHT PROX CCA EDV: 10.6 CM/SEC
BH CV XLRA MEAS RIGHT PROX CCA PSV: 69.6 CM/SEC
BH CV XLRA MEAS RIGHT PROX ECA PSV: 126 CM/SEC
BH CV XLRA MEAS RIGHT PROX ICA EDV: 17.4 CM/SEC
BH CV XLRA MEAS RIGHT PROX ICA PSV: 59 CM/SEC
BH CV XLRA MEAS RIGHT PROX SCLA PSV: 210 CM/SEC
BH CV XLRA MEAS RIGHT VERTEBRAL A EDV: 13.2 CM/SEC
BH CV XLRA MEAS RIGHT VERTEBRAL A PSV: 59.7 CM/SEC
BH CVPROX RIGHT ICA HIDDEN LRR: 1 CM
LEFT ARM BP: NORMAL MMHG
RIGHT ARM BP: NORMAL MMHG

## 2024-11-01 PROCEDURE — 93880 EXTRACRANIAL BILAT STUDY: CPT

## 2024-11-06 ENCOUNTER — TELEPHONE (OUTPATIENT)
Dept: CARDIOLOGY | Facility: CLINIC | Age: 84
End: 2024-11-06
Payer: MEDICARE

## 2024-11-06 NOTE — TELEPHONE ENCOUNTER
Patient contacted to review carotid ultrasound results. All questions answered at this time, pt verbalizes understanding.